# Patient Record
Sex: FEMALE | Race: WHITE | NOT HISPANIC OR LATINO | Employment: FULL TIME | URBAN - METROPOLITAN AREA
[De-identification: names, ages, dates, MRNs, and addresses within clinical notes are randomized per-mention and may not be internally consistent; named-entity substitution may affect disease eponyms.]

---

## 2017-03-29 ENCOUNTER — OFFICE VISIT (OUTPATIENT)
Dept: FAMILY MEDICINE CLINIC | Facility: CLINIC | Age: 52
End: 2017-03-29

## 2017-03-29 VITALS
HEART RATE: 86 BPM | WEIGHT: 150 LBS | OXYGEN SATURATION: 98 % | TEMPERATURE: 99 F | BODY MASS INDEX: 23.54 KG/M2 | HEIGHT: 67 IN | DIASTOLIC BLOOD PRESSURE: 64 MMHG | SYSTOLIC BLOOD PRESSURE: 92 MMHG

## 2017-03-29 DIAGNOSIS — J40 BRONCHITIS: Primary | ICD-10-CM

## 2017-03-29 LAB
EXPIRATION DATE: NORMAL
FLUAV AG NPH QL: NEGATIVE
FLUBV AG NPH QL: NEGATIVE
INTERNAL CONTROL: NORMAL
Lab: NORMAL

## 2017-03-29 PROCEDURE — 87804 INFLUENZA ASSAY W/OPTIC: CPT | Performed by: PHYSICIAN ASSISTANT

## 2017-03-29 PROCEDURE — 99213 OFFICE O/P EST LOW 20 MIN: CPT | Performed by: PHYSICIAN ASSISTANT

## 2017-03-29 RX ORDER — SPIRONOLACTONE 100 MG/1
100 TABLET, FILM COATED ORAL DAILY
COMMUNITY
Start: 2017-02-12 | End: 2018-09-17 | Stop reason: SDUPTHER

## 2017-03-29 RX ORDER — CITALOPRAM 20 MG/1
20 TABLET ORAL DAILY
COMMUNITY
Start: 2017-03-06 | End: 2017-08-26 | Stop reason: ALTCHOICE

## 2017-03-29 RX ORDER — LEVOTHYROXINE SODIUM 0.03 MG/1
25 TABLET ORAL DAILY
COMMUNITY
Start: 2017-02-12 | End: 2018-09-17 | Stop reason: SDUPTHER

## 2017-03-29 NOTE — PROGRESS NOTES
Subjective   Cadence Georges is a 51 y.o. female    Cough   This is a new problem. The current episode started yesterday. The problem has been gradually improving. The cough is productive of brown sputum and productive of purulent sputum. Associated symptoms include ear congestion, ear pain, a fever, headaches, nasal congestion, postnasal drip, rhinorrhea, a sore throat, sweats and wheezing. Pertinent negatives include no chest pain, chills or shortness of breath. The symptoms are aggravated by lying down. The treatment provided mild relief.    fever chills body aches, worse last 24 hours    The following portions of the patient's history were reviewed and updated as appropriate: allergies, current medications, past social history and problem list    Review of Systems   Constitutional: Positive for fever. Negative for chills and fatigue.   HENT: Positive for congestion, ear pain, postnasal drip, rhinorrhea, sinus pressure and sore throat.    Eyes: Positive for pain.   Respiratory: Positive for cough and wheezing. Negative for chest tightness and shortness of breath.    Cardiovascular: Negative for chest pain.   Gastrointestinal: Negative for nausea.   Neurological: Positive for headaches. Negative for dizziness.   Hematological: Negative for adenopathy.       Objective     Vitals:    03/29/17 1318   BP: 92/64   Pulse: 86   Temp: 99 °F (37.2 °C)   SpO2: 98%       Physical Exam   Constitutional: She appears well-developed and well-nourished.   HENT:   Head: Normocephalic and atraumatic.   Right Ear: Tympanic membrane and ear canal normal.   Left Ear: Tympanic membrane and ear canal normal.   Nose: Mucosal edema, rhinorrhea and sinus tenderness present. Right sinus exhibits maxillary sinus tenderness and frontal sinus tenderness. Left sinus exhibits maxillary sinus tenderness and frontal sinus tenderness.   Mouth/Throat: Oropharynx is clear and moist. No oropharyngeal exudate.   Eyes: Pupils are equal, round, and  reactive to light.   Cardiovascular: Normal rate and regular rhythm.    Pulmonary/Chest: Effort normal. She has decreased breath sounds in the right middle field and the left middle field.   Nursing note and vitals reviewed.      Assessment/Plan     Diagnoses and all orders for this visit:    Bronchitis  -     clarithromycin XL (BIAXIN XL) 500 MG 24 hr tablet; Take 2 tablets by mouth Daily.    Other orders  -     citalopram (CeleXA) 20 MG tablet; Take 20 mg by mouth Daily.  -     levothyroxine (SYNTHROID, LEVOTHROID) 25 MCG tablet; Take 25 mcg by mouth Daily.  -     spironolactone (ALDACTONE) 100 MG tablet; Take 100 mg by mouth Daily.     #1 Biaxin  mg 2 by mouth every day dispensed     Tussionex 1 teaspoon every 12 hours dispense 120 ML's

## 2017-08-26 ENCOUNTER — OFFICE VISIT (OUTPATIENT)
Dept: RETAIL CLINIC | Facility: CLINIC | Age: 52
End: 2017-08-26

## 2017-08-26 VITALS
OXYGEN SATURATION: 99 % | TEMPERATURE: 98.7 F | BODY MASS INDEX: 23.39 KG/M2 | RESPIRATION RATE: 20 BRPM | HEART RATE: 77 BPM | WEIGHT: 149 LBS | HEIGHT: 67 IN

## 2017-08-26 DIAGNOSIS — J02.9 SORE THROAT: ICD-10-CM

## 2017-08-26 DIAGNOSIS — R05.9 COUGHING: ICD-10-CM

## 2017-08-26 DIAGNOSIS — J06.9 ACUTE URI: Primary | ICD-10-CM

## 2017-08-26 LAB
EXPIRATION DATE: NORMAL
INTERNAL CONTROL: NORMAL
Lab: NORMAL
S PYO AG THROAT QL: NEGATIVE

## 2017-08-26 PROCEDURE — 87880 STREP A ASSAY W/OPTIC: CPT | Performed by: NURSE PRACTITIONER

## 2017-08-26 PROCEDURE — 99213 OFFICE O/P EST LOW 20 MIN: CPT | Performed by: NURSE PRACTITIONER

## 2017-08-26 RX ORDER — FLUTICASONE PROPIONATE 50 MCG
2 SPRAY, SUSPENSION (ML) NASAL DAILY
Qty: 1 EACH | Refills: 0 | Status: SHIPPED | OUTPATIENT
Start: 2017-08-26 | End: 2017-09-25

## 2017-08-26 RX ORDER — AMOXICILLIN 875 MG/1
875 TABLET, COATED ORAL 2 TIMES DAILY
Qty: 14 TABLET | Refills: 0 | Status: SHIPPED | OUTPATIENT
Start: 2017-08-26 | End: 2017-09-02

## 2017-08-26 RX ORDER — BENZONATATE 100 MG/1
100 CAPSULE ORAL 3 TIMES DAILY PRN
Qty: 15 CAPSULE | Refills: 0 | Status: SHIPPED | OUTPATIENT
Start: 2017-08-26 | End: 2017-08-31

## 2017-08-26 RX ORDER — LORATADINE 10 MG/1
10 TABLET ORAL DAILY
Qty: 30 TABLET | Refills: 0 | Status: SHIPPED | OUTPATIENT
Start: 2017-08-26 | End: 2017-09-25

## 2017-08-26 NOTE — PATIENT INSTRUCTIONS
Cough, Adult  A cough helps to clear your throat and lungs. A cough may last only 2-3 weeks (acute), or it may last longer than 8 weeks (chronic). Many different things can cause a cough. A cough may be a sign of an illness or another medical condition.  HOME CARE  · Pay attention to any changes in your cough.  · Take medicines only as told by your doctor.    If you were prescribed an antibiotic medicine, take it as told by your doctor. Do not stop taking it even if you start to feel better.    Talk with your doctor before you try using a cough medicine.  · Drink enough fluid to keep your pee (urine) clear or pale yellow.  · If the air is dry, use a cold steam vaporizer or humidifier in your home.  · Stay away from things that make you cough at work or at home.  · If your cough is worse at night, try using extra pillows to raise your head up higher while you sleep.  · Do not smoke, and try not to be around smoke. If you need help quitting, ask your doctor.  · Do not have caffeine.  · Do not drink alcohol.  · Rest as needed.  GET HELP IF:  · You have new problems (symptoms).  · You cough up yellow fluid (pus).  · Your cough does not get better after 2-3 weeks, or your cough gets worse.  · Medicine does not help your cough and you are not sleeping well.  · You have pain that gets worse or pain that is not helped with medicine.  · You have a fever.  · You are losing weight and you do not know why.  · You have night sweats.  GET HELP RIGHT AWAY IF:  · You cough up blood.  · You have trouble breathing.  · Your heartbeat is very fast.     This information is not intended to replace advice given to you by your health care provider. Make sure you discuss any questions you have with your health care provider.     Document Released: 08/30/2012 Document Revised: 09/07/2016 Document Reviewed: 02/24/2016  [employer] Interactive Patient Education ©2017 [employer]  Upper Respiratory Infection, Adult  Most upper respiratory  "infections (URIs) are caused by a virus. A URI affects the nose, throat, and upper air passages. The most common type of URI is often called \"the common cold.\"  HOME CARE   · Take medicines only as told by your doctor.  · Gargle warm saltwater or take cough drops to comfort your throat as told by your doctor.  · Use a warm mist humidifier or inhale steam from a shower to increase air moisture. This may make it easier to breathe.  · Drink enough fluid to keep your pee (urine) clear or pale yellow.  · Eat soups and other clear broths.  · Have a healthy diet.  · Rest as needed.  · Go back to work when your fever is gone or your doctor says it is okay.  ¨ You may need to stay home longer to avoid giving your URI to others.  ¨ You can also wear a face mask and wash your hands often to prevent spread of the virus.  · Use your inhaler more if you have asthma.  · Do not use any tobacco products, including cigarettes, chewing tobacco, or electronic cigarettes. If you need help quitting, ask your doctor.  GET HELP IF:  · You are getting worse, not better.  · Your symptoms are not helped by medicine.  · You have chills.  · You are getting more short of breath.  · You have brown or red mucus.  · You have yellow or brown discharge from your nose.  · You have pain in your face, especially when you bend forward.  · You have a fever.  · You have puffy (swollen) neck glands.  · You have pain while swallowing.  · You have white areas in the back of your throat.  GET HELP RIGHT AWAY IF:   · You have very bad or constant:    Headache.    Ear pain.    Pain in your forehead, behind your eyes, and over your cheekbones (sinus pain).    Chest pain.  · You have long-lasting (chronic) lung disease and any of the following:    Wheezing.    Long-lasting cough.    Coughing up blood.    A change in your usual mucus.  · You have a stiff neck.  · You have changes in your:    Vision.    Hearing.    Thinking.    Mood.  MAKE SURE YOU:   · Understand " these instructions.  · Will watch your condition.  · Will get help right away if you are not doing well or get worse.     This information is not intended to replace advice given to you by your health care provider. Make sure you discuss any questions you have with your health care provider.    Medications and plan of care reviewed with patient and teaching done on medication reactions/side effects.  Take antibiotic if worse as discussed,  Rest, plenty of fluids, comfort measures, humidifier, warm salt water gargles, and follow up with PCP if symptoms persist.  If worse go to urgent care or ER as discussed.     Document Released: 06/05/2009 Document Revised: 05/03/2016 Document Reviewed: 03/25/2015  [employer] Interactive Patient Education ©2017 [employer]

## 2017-08-26 NOTE — PROGRESS NOTES
Subjective   Cadence Georges is a 52 y.o. female presents with sore throat and upper respiratory symptoms.  Patient wants to rule out strep due to watching grandchild, doesn't want her to get sick.     URI    This is a new problem. The current episode started in the past 7 days. The problem has been gradually worsening. There has been no fever. Associated symptoms include congestion, coughing (productive cough), ear pain (bilat ear pressure), headaches and a sore throat. Pertinent negatives include no abdominal pain, chest pain, diarrhea, dysuria, nausea, neck pain, rash, rhinorrhea, sneezing, swollen glands, vomiting or wheezing. Treatments tried: nyquil and chloroseptic spray. The treatment provided mild relief.   Sore Throat    This is a new problem. The current episode started in the past 7 days. The problem has been waxing and waning. There has been no fever. The pain is at a severity of 4/10. The pain is mild. Associated symptoms include congestion, coughing (productive cough), ear pain (bilat ear pressure) and headaches. Pertinent negatives include no abdominal pain, diarrhea, ear discharge, hoarse voice, neck pain, shortness of breath, stridor, swollen glands, trouble swallowing or vomiting. Treatments tried: nyquil and chloroseptic spray. The treatment provided mild relief.        The following portions of the patient's history were reviewed and updated as appropriate: allergies, current medications, past family history, past medical history, past social history and past surgical history.    Review of Systems   Constitutional: Negative for chills, fever and unexpected weight change.   HENT: Positive for congestion, ear pain (bilat ear pressure), postnasal drip and sore throat. Negative for ear discharge, hoarse voice, rhinorrhea, sinus pressure, sneezing, trouble swallowing and voice change.    Eyes: Negative.    Respiratory: Positive for cough (productive cough) and chest tightness (some chest tightness or  congestion especially with coughing). Negative for shortness of breath, wheezing and stridor.    Cardiovascular: Negative.  Negative for chest pain.   Gastrointestinal: Negative.  Negative for abdominal pain, diarrhea, nausea and vomiting.   Genitourinary: Negative.  Negative for dysuria.   Musculoskeletal: Negative.  Negative for neck pain.   Skin: Negative.  Negative for rash.   Neurological: Positive for headaches.       Objective   Physical Exam   Constitutional: She is oriented to person, place, and time. She appears well-developed and well-nourished. No distress.   HENT:   Head: Normocephalic and atraumatic.   Right Ear: A middle ear effusion is present.   Left Ear: A middle ear effusion is present.   Nose: Mucosal edema present.   Mouth/Throat: Uvula is midline and mucous membranes are normal. Posterior oropharyngeal erythema (mild erythema with PND) present. Tonsils are 0 on the right. Tonsils are 0 on the left. No tonsillar exudate.   Eyes: Conjunctivae and lids are normal. Pupils are equal, round, and reactive to light.   Neck: Normal range of motion.   Cardiovascular: Normal rate, regular rhythm and normal heart sounds.    No murmur heard.  Pulmonary/Chest: Effort normal and breath sounds normal. No respiratory distress.   Lymphadenopathy:     She has cervical adenopathy (bilat anterior cervical with tenderness on palpation).   Neurological: She is alert and oriented to person, place, and time.   Skin: Skin is warm and dry.   Psychiatric: She has a normal mood and affect. Her speech is normal and behavior is normal.       Assessment/Plan   Cadence was seen today for cough and sore throat.    Diagnoses and all orders for this visit:    Sore throat  -     POC Rapid Strep A, negative, discussed with patient  Results for orders placed or performed in visit on 08/26/17   POC Rapid Strep A   Result Value Ref Range    Rapid Strep A Screen Negative Negative, VALID, INVALID, Not Performed    Internal Control  Passed Passed    Lot Number [id number]     Expiration Date 2/2019          Acute URI  Coughing  -     loratadine (CLARITIN) 10 MG tablet; Take 1 tablet by mouth Daily for 30 days.  -     fluticasone (FLONASE) 50 MCG/ACT nasal spray; 2 sprays into each nostril Daily for 30 days.  -     amoxicillin (AMOXIL) 875 MG tablet; Take 1 tablet by mouth 2 (Two) Times a Day for 7 days.  -     benzonatate (TESSALON) 100 MG capsule; Take 1 capsule by mouth 3 (Three) Times a Day As Needed for Cough for up to 5 days.      Medications and plan of care reviewed with patient and teaching done on medication reactions/side effects.  Take antibiotic if worse as discussed,  Rest, plenty of fluids, comfort measures, humidifier, warm salt water gargles, and follow up with PCP if symptoms persist.  If worse go to urgent care or ER as discussed.  Patient verbalized understanding.    ASHLEE Whyte

## 2017-10-03 ENCOUNTER — TRANSCRIBE ORDERS (OUTPATIENT)
Dept: ADMINISTRATIVE | Facility: HOSPITAL | Age: 52
End: 2017-10-03

## 2017-10-03 DIAGNOSIS — Z12.31 VISIT FOR SCREENING MAMMOGRAM: Primary | ICD-10-CM

## 2017-11-13 ENCOUNTER — HOSPITAL ENCOUNTER (OUTPATIENT)
Dept: MAMMOGRAPHY | Facility: HOSPITAL | Age: 52
Discharge: HOME | End: 2017-11-13
Attending: OBSTETRICS & GYNECOLOGY | Admitting: OBSTETRICS & GYNECOLOGY

## 2017-11-13 DIAGNOSIS — Z12.31 VISIT FOR SCREENING MAMMOGRAM: ICD-10-CM

## 2017-11-13 PROCEDURE — 77067 SCR MAMMO BI INCL CAD: CPT | Performed by: RADIOLOGY

## 2017-11-13 PROCEDURE — 77063 BREAST TOMOSYNTHESIS BI: CPT

## 2017-11-13 PROCEDURE — 77063 BREAST TOMOSYNTHESIS BI: CPT | Performed by: RADIOLOGY

## 2017-11-13 PROCEDURE — G0202 SCR MAMMO BI INCL CAD: HCPCS

## 2018-09-17 ENCOUNTER — OFFICE VISIT (OUTPATIENT)
Dept: FAMILY MEDICINE CLINIC | Facility: CLINIC | Age: 53
End: 2018-09-17

## 2018-09-17 VITALS
HEART RATE: 68 BPM | SYSTOLIC BLOOD PRESSURE: 116 MMHG | TEMPERATURE: 98.3 F | WEIGHT: 148 LBS | BODY MASS INDEX: 23.23 KG/M2 | DIASTOLIC BLOOD PRESSURE: 64 MMHG | OXYGEN SATURATION: 99 % | HEIGHT: 67 IN

## 2018-09-17 DIAGNOSIS — E03.9 ACQUIRED HYPOTHYROIDISM: ICD-10-CM

## 2018-09-17 DIAGNOSIS — Z00.00 GENERAL MEDICAL EXAM: Primary | ICD-10-CM

## 2018-09-17 DIAGNOSIS — Z12.11 COLON CANCER SCREENING: ICD-10-CM

## 2018-09-17 PROCEDURE — 99396 PREV VISIT EST AGE 40-64: CPT | Performed by: PHYSICIAN ASSISTANT

## 2018-09-17 RX ORDER — LORATADINE 10 MG/1
CAPSULE, LIQUID FILLED ORAL
COMMUNITY
End: 2021-03-22

## 2018-09-17 NOTE — PROGRESS NOTES
Subjective   Cadence Georges is a 53 y.o. female  Annual Exam (Annual Physical Exam )      History of Present Illness  Patient presents today for a preventive medical visit.  Patient is here to determine screening labs and tests that are due and to determine immunization status as well.  Patient will be counseled regarding preventative medicine issues such as regular exercise and  healthy diet as well.    The following portions of the patient's history were reviewed and updated as appropriate: allergies, current medications, past social history and problem list    Review of Systems   Constitutional: Negative.    HENT: Negative.    Eyes: Negative.    Respiratory: Negative.    Cardiovascular: Negative.    Gastrointestinal: Negative.    Endocrine: Negative.    Genitourinary: Negative.    Musculoskeletal: Negative.    Skin: Negative.    Allergic/Immunologic: Negative.    Neurological: Negative.    Hematological: Negative.    Psychiatric/Behavioral: Negative.    All other systems reviewed and are negative.      Objective     Vitals:    09/17/18 1428   BP: 116/64   Pulse: 68   Temp: 98.3 °F (36.8 °C)   SpO2: 99%       Physical Exam   Constitutional: She is oriented to person, place, and time. She appears well-developed and well-nourished.   HENT:   Head: Normocephalic and atraumatic.   Right Ear: External ear normal.   Left Ear: External ear normal.   Nose: Nose normal.   Mouth/Throat: Oropharynx is clear and moist.   Eyes: Pupils are equal, round, and reactive to light. Conjunctivae and EOM are normal.   Neck: Normal range of motion. Neck supple. No JVD present. Carotid bruit is not present. No thyromegaly present.   Cardiovascular: Normal rate, regular rhythm, normal heart sounds and intact distal pulses.    No murmur heard.  Pulmonary/Chest: Effort normal and breath sounds normal.   Abdominal: Soft. Bowel sounds are normal. She exhibits no mass. There is no tenderness.   Musculoskeletal: Normal range of motion. She  exhibits no edema.   Lymphadenopathy:     She has no cervical adenopathy.   Neurological: She is alert and oriented to person, place, and time. She has normal reflexes. No cranial nerve deficit.   Skin: Skin is warm and dry.   Psychiatric: She has a normal mood and affect.   Nursing note and vitals reviewed.    ECG 12 Lead  Date/Time: 9/18/2018 8:55 AM  Performed by: MOIZ LOPEZ  Authorized by: MOIZ LOPEZ   Comparison: not compared with previous ECG   Rhythm: sinus rhythm  Rate: normal  Conduction: conduction normal  ST Segments: ST segments normal  T Waves: T waves normal  QRS axis: normal  Clinical impression: normal ECG          Discussed preventative medicine issues with patient including regular exercise, healthy diet, stress reduction, adequate sleep and recommended age-appropriate screening studies.  Assessment/Plan     Diagnoses and all orders for this visit:    General medical exam  -     TSH; Future  -     Basic Metabolic Panel; Future  -     CBC & Differential; Future  -     Lipid Panel; Future    Colon cancer screening  -     Ambulatory Referral to Colorectal Surgery    Acquired hypothyroidism  -     TSH; Future    Other orders  -     Loratadine (CLARITIN) 10 MG capsule; Take  by mouth.  -     ECG 12 Lead

## 2018-09-18 ENCOUNTER — TRANSCRIBE ORDERS (OUTPATIENT)
Dept: ADMINISTRATIVE | Facility: HOSPITAL | Age: 53
End: 2018-09-18

## 2018-09-18 DIAGNOSIS — Z12.31 VISIT FOR SCREENING MAMMOGRAM: Primary | ICD-10-CM

## 2018-09-18 PROCEDURE — 93000 ELECTROCARDIOGRAM COMPLETE: CPT | Performed by: PHYSICIAN ASSISTANT

## 2018-09-19 ENCOUNTER — LAB (OUTPATIENT)
Dept: LAB | Facility: HOSPITAL | Age: 53
End: 2018-09-19

## 2018-09-19 DIAGNOSIS — E03.9 ACQUIRED HYPOTHYROIDISM: ICD-10-CM

## 2018-09-19 DIAGNOSIS — Z00.00 GENERAL MEDICAL EXAM: ICD-10-CM

## 2018-09-19 LAB
ANION GAP SERPL CALCULATED.3IONS-SCNC: 6 MMOL/L (ref 3–11)
ARTICHOKE IGE QN: 100 MG/DL (ref 0–130)
BASOPHILS # BLD AUTO: 0.06 10*3/MM3 (ref 0–0.2)
BASOPHILS NFR BLD AUTO: 0.7 % (ref 0–1)
BUN BLD-MCNC: 14 MG/DL (ref 9–23)
BUN/CREAT SERPL: 17.5 (ref 7–25)
CALCIUM SPEC-SCNC: 9.1 MG/DL (ref 8.7–10.4)
CHLORIDE SERPL-SCNC: 105 MMOL/L (ref 99–109)
CHOLEST SERPL-MCNC: 162 MG/DL (ref 0–200)
CO2 SERPL-SCNC: 28 MMOL/L (ref 20–31)
CREAT BLD-MCNC: 0.8 MG/DL (ref 0.6–1.3)
DEPRECATED RDW RBC AUTO: 40.4 FL (ref 37–54)
EOSINOPHIL # BLD AUTO: 0.28 10*3/MM3 (ref 0–0.3)
EOSINOPHIL NFR BLD AUTO: 3.4 % (ref 0–3)
ERYTHROCYTE [DISTWIDTH] IN BLOOD BY AUTOMATED COUNT: 11.9 % (ref 11.3–14.5)
GFR SERPL CREATININE-BSD FRML MDRD: 76 ML/MIN/1.73
GLUCOSE BLD-MCNC: 86 MG/DL (ref 70–100)
HCT VFR BLD AUTO: 43.2 % (ref 34.5–44)
HDLC SERPL-MCNC: 60 MG/DL (ref 40–60)
HGB BLD-MCNC: 13.8 G/DL (ref 11.5–15.5)
IMM GRANULOCYTES # BLD: 0.02 10*3/MM3 (ref 0–0.03)
IMM GRANULOCYTES NFR BLD: 0.2 % (ref 0–0.6)
LYMPHOCYTES # BLD AUTO: 2.97 10*3/MM3 (ref 0.6–4.8)
LYMPHOCYTES NFR BLD AUTO: 36.6 % (ref 24–44)
MCH RBC QN AUTO: 29.2 PG (ref 27–31)
MCHC RBC AUTO-ENTMCNC: 31.9 G/DL (ref 32–36)
MCV RBC AUTO: 91.5 FL (ref 80–99)
MONOCYTES # BLD AUTO: 0.7 10*3/MM3 (ref 0–1)
MONOCYTES NFR BLD AUTO: 8.6 % (ref 0–12)
NEUTROPHILS # BLD AUTO: 4.09 10*3/MM3 (ref 1.5–8.3)
NEUTROPHILS NFR BLD AUTO: 50.5 % (ref 41–71)
PLATELET # BLD AUTO: 239 10*3/MM3 (ref 150–450)
PMV BLD AUTO: 10 FL (ref 6–12)
POTASSIUM BLD-SCNC: 4 MMOL/L (ref 3.5–5.5)
RBC # BLD AUTO: 4.72 10*6/MM3 (ref 3.89–5.14)
SODIUM BLD-SCNC: 139 MMOL/L (ref 132–146)
TRIGL SERPL-MCNC: 58 MG/DL (ref 0–150)
TSH SERPL DL<=0.05 MIU/L-ACNC: 2.52 MIU/ML (ref 0.35–5.35)
WBC NRBC COR # BLD: 8.12 10*3/MM3 (ref 3.5–10.8)

## 2018-09-19 PROCEDURE — 84443 ASSAY THYROID STIM HORMONE: CPT

## 2018-09-19 PROCEDURE — 80048 BASIC METABOLIC PNL TOTAL CA: CPT

## 2018-09-19 PROCEDURE — 80061 LIPID PANEL: CPT

## 2018-09-19 PROCEDURE — 36415 COLL VENOUS BLD VENIPUNCTURE: CPT

## 2018-09-19 PROCEDURE — 85025 COMPLETE CBC W/AUTO DIFF WBC: CPT

## 2018-10-15 ENCOUNTER — TELEPHONE (OUTPATIENT)
Dept: FAMILY MEDICINE CLINIC | Facility: CLINIC | Age: 53
End: 2018-10-15

## 2018-10-15 NOTE — TELEPHONE ENCOUNTER
----- Message from Tangela Bullock sent at 10/15/2018  2:37 PM EDT -----  Contact: PT  REQUESTING VALTREX AND DENAVIR 1% (1.5G TUBE) FOR FEVER BLISTER    [facility] - [phone] (Phone)  [phone] (Fax)

## 2018-10-16 RX ORDER — VALACYCLOVIR HYDROCHLORIDE 1 G/1
1000 TABLET, FILM COATED ORAL 2 TIMES DAILY
Qty: 10 TABLET | Refills: 0 | Status: SHIPPED | OUTPATIENT
Start: 2018-10-16 | End: 2021-03-22 | Stop reason: SDUPTHER

## 2018-10-16 RX ORDER — PENCICLOVIR 10 MG/G
CREAM TOPICAL
Qty: 5 G | Refills: 0 | Status: SHIPPED | OUTPATIENT
Start: 2018-10-16 | End: 2021-03-22

## 2018-10-30 ENCOUNTER — OFFICE VISIT (OUTPATIENT)
Dept: RETAIL CLINIC | Facility: CLINIC | Age: 53
End: 2018-10-30

## 2018-10-30 VITALS
OXYGEN SATURATION: 98 % | HEART RATE: 82 BPM | SYSTOLIC BLOOD PRESSURE: 98 MMHG | TEMPERATURE: 98.3 F | BODY MASS INDEX: 23.07 KG/M2 | HEIGHT: 67 IN | RESPIRATION RATE: 20 BRPM | WEIGHT: 147 LBS | DIASTOLIC BLOOD PRESSURE: 74 MMHG

## 2018-10-30 DIAGNOSIS — J02.9 ACUTE VIRAL PHARYNGITIS: Primary | ICD-10-CM

## 2018-10-30 LAB
EXPIRATION DATE: NORMAL
INTERNAL CONTROL: NORMAL
Lab: NORMAL
S PYO AG THROAT QL: NEGATIVE

## 2018-10-30 PROCEDURE — 99213 OFFICE O/P EST LOW 20 MIN: CPT | Performed by: NURSE PRACTITIONER

## 2018-10-30 PROCEDURE — 87880 STREP A ASSAY W/OPTIC: CPT | Performed by: NURSE PRACTITIONER

## 2018-10-30 RX ORDER — IBUPROFEN 800 MG/1
800 TABLET ORAL EVERY 6 HOURS PRN
Qty: 30 TABLET | Refills: 0 | Status: SHIPPED | OUTPATIENT
Start: 2018-10-30 | End: 2020-07-22

## 2018-10-30 NOTE — PROGRESS NOTES
Subjective   Cadence Georges is a 53 y.o. female.   Chief Complaint   Patient presents with   • Sore Throat      52 yo w/f presents with complaint of sore throat duration of 2 days, no fever.  Reports throat is a 2/10, 8/10 when swallowing.  She has taken no medication prior to arrival at clinic.  Refer to HPI/ROS for additional information.      Sore Throat    This is a new problem. The current episode started in the past 7 days. The problem has been gradually worsening. Neither side of throat is experiencing more pain than the other. There has been no fever. The pain is at a severity of 8/10. Associated symptoms include a hoarse voice. She has tried NSAIDs for the symptoms. The treatment provided mild relief.        The following portions of the patient's history were reviewed and updated as appropriate: allergies, current medications, past family history, past medical history, past social history, past surgical history and problem list.    Current Outpatient Prescriptions:   •  citalopram (CeleXA) 20 MG tablet, Take 1.5 tablets by mouth Daily., Disp: 135 tablet, Rfl: 3  •  Conj Estrogens-Bazedoxifene 0.45-20 MG tablet, Take 1 tablet by mouth Daily., Disp: 30 tablet, Rfl: 11  •  Loratadine (CLARITIN) 10 MG capsule, Take  by mouth., Disp: , Rfl:   •  spironolactone (ALDACTONE) 100 MG tablet, Take 1 tablet by mouth Daily., Disp: 90 tablet, Rfl: 3  •  benzocaine-menthol (CHLORASEPTIC) 6-10 MG lozenge, Take 1 lozenge by mouth Every 2 (Two) Hours As Needed for Sore Throat., Disp: , Rfl:   •  ibuprofen (ADVIL,MOTRIN) 800 MG tablet, Take 1 tablet by mouth Every 6 (Six) Hours As Needed for Moderate Pain  or Fever., Disp: 30 tablet, Rfl: 0  •  penciclovir (DENAVIR) 1 % cream, Apply topically to the appropriate area as directed Every 2 (Two) Hours., Disp: 5 g, Rfl: 0  •  valACYclovir (VALTREX) 1000 MG tablet, Take 1 tablet by mouth 2 (Two) Times a Day., Disp: 10 tablet, Rfl: 0  •  valACYclovir (VALTREX) 500 MG tablet, Take 4  "tablets by mouth Every 12 (Twelve) Hours x1 day., Disp: 8 tablet, Rfl: 0    Review of Systems   Constitutional: Positive for activity change, fatigue and fever.   HENT: Positive for hoarse voice and sore throat.    Eyes: Negative.    Respiratory: Negative.    Cardiovascular: Negative.    Gastrointestinal: Negative.    Skin: Negative.    Psychiatric/Behavioral: Negative.      BP 98/74 (BP Location: Left arm, Patient Position: Sitting)   Pulse 82   Temp 98.3 °F (36.8 °C) (Oral)   Resp 20   Ht 170.2 cm (67\")   Wt 66.7 kg (147 lb)   SpO2 98%   BMI 23.02 kg/m²     Objective   No Known Allergies    Physical Exam   Constitutional: She appears well-developed and well-nourished. No distress.   HENT:   Head: Normocephalic.   Nose: Nose normal.   Mouth/Throat: Mucous membranes are normal. Mucous membranes are not pale, not dry and not cyanotic. Posterior oropharyngeal erythema present. Tonsils are 0 on the right. Tonsils are 0 on the left. No tonsillar exudate.   Eyes: Conjunctivae are normal.   Neck: Normal range of motion. No JVD present. No tracheal deviation present. No thyromegaly present.   Cardiovascular: Normal rate, regular rhythm and normal heart sounds.  Exam reveals no gallop and no friction rub.    No murmur heard.  Pulmonary/Chest: Effort normal and breath sounds normal. No stridor. No respiratory distress. She has no wheezes. She has no rales. She exhibits no tenderness.   Lymphadenopathy:     She has no cervical adenopathy.   Skin: Skin is warm. Capillary refill takes less than 2 seconds. She is not diaphoretic.   Psychiatric: She has a normal mood and affect. Her behavior is normal.   Vitals reviewed.      Assessment/Plan   Cadence was seen today for sore throat.    Diagnoses and all orders for this visit:    Acute viral pharyngitis  -     POC Rapid Strep A    Other orders  -     ibuprofen (ADVIL,MOTRIN) 800 MG tablet; Take 1 tablet by mouth Every 6 (Six) Hours As Needed for Moderate Pain  or Fever.  -  "    benzocaine-menthol (CHLORASEPTIC) 6-10 MG lozenge; Take 1 lozenge by mouth Every 2 (Two) Hours As Needed for Sore Throat.        Results for orders placed or performed in visit on 10/30/18   POC Rapid Strep A   Result Value Ref Range    Rapid Strep A Screen Negative Negative, VALID, INVALID, Not Performed    Internal Control Passed Passed    Lot Number [id number]     Expiration Date 3,078,020          An After Visit Summary was printed, reviewed, and given to the patient. Understanding verbalized and agrees with treatment plan.  If no improvement or becomes worse, follow up with primary or go to [facility]/ER.        October 30, 2018 10:43 AM

## 2018-10-30 NOTE — PATIENT INSTRUCTIONS
Pharyngitis  Pharyngitis is a sore throat (pharynx). There is redness, pain, and swelling of your throat.  Follow these instructions at home:  · Drink enough fluids to keep your pee (urine) clear or pale yellow.  · Only take medicine as told by your doctor.  ? You may get sick again if you do not take medicine as told. Finish your medicines, even if you start to feel better.  ? Do not take aspirin.  · Rest.  · Rinse your mouth (gargle) with salt water (½ tsp of salt per 1 qt of water) every 1-2 hours. This will help the pain.  · If you are not at risk for choking, you can suck on hard candy or sore throat lozenges.  Contact a doctor if:  · You have large, tender lumps on your neck.  · You have a rash.  · You cough up green, yellow-brown, or bloody spit.  Get help right away if:  · You have a stiff neck.  · You drool or cannot swallow liquids.  · You throw up (vomit) or are not able to keep medicine or liquids down.  · You have very bad pain that does not go away with medicine.  · You have problems breathing (not from a stuffy nose).  This information is not intended to replace advice given to you by your health care provider. Make sure you discuss any questions you have with your health care provider.  Document Released: 06/05/2009 Document Revised: 05/25/2017 Document Reviewed: 08/25/2014  [employer] Interactive Patient Education © 2017 [employer]

## 2018-11-19 ENCOUNTER — HOSPITAL ENCOUNTER (OUTPATIENT)
Dept: MAMMOGRAPHY | Facility: HOSPITAL | Age: 53
Discharge: HOME | End: 2018-11-19
Attending: OBSTETRICS & GYNECOLOGY | Admitting: OBSTETRICS & GYNECOLOGY

## 2018-11-19 DIAGNOSIS — Z12.31 VISIT FOR SCREENING MAMMOGRAM: ICD-10-CM

## 2018-11-19 PROCEDURE — 77063 BREAST TOMOSYNTHESIS BI: CPT

## 2018-11-19 PROCEDURE — 77067 SCR MAMMO BI INCL CAD: CPT

## 2018-11-19 PROCEDURE — 77067 SCR MAMMO BI INCL CAD: CPT | Performed by: RADIOLOGY

## 2018-11-19 PROCEDURE — 77063 BREAST TOMOSYNTHESIS BI: CPT | Performed by: RADIOLOGY

## 2019-01-07 ENCOUNTER — HOSPITAL ENCOUNTER (OUTPATIENT)
Dept: MAMMOGRAPHY | Facility: HOSPITAL | Age: 54
Discharge: HOME | End: 2019-01-07
Admitting: RADIOLOGY

## 2019-01-07 ENCOUNTER — HOSPITAL ENCOUNTER (OUTPATIENT)
Dept: ULTRASOUND IMAGING | Facility: HOSPITAL | Age: 54
Discharge: HOME | End: 2019-01-07

## 2019-01-07 DIAGNOSIS — R92.8 ABNORMAL MAMMOGRAM: ICD-10-CM

## 2019-01-07 PROCEDURE — 76642 ULTRASOUND BREAST LIMITED: CPT | Performed by: RADIOLOGY

## 2019-01-07 PROCEDURE — 77061 BREAST TOMOSYNTHESIS UNI: CPT | Performed by: RADIOLOGY

## 2019-01-07 PROCEDURE — G0279 TOMOSYNTHESIS, MAMMO: HCPCS

## 2019-01-07 PROCEDURE — 77065 DX MAMMO INCL CAD UNI: CPT | Performed by: RADIOLOGY

## 2019-01-07 PROCEDURE — 76642 ULTRASOUND BREAST LIMITED: CPT

## 2019-01-07 PROCEDURE — 77065 DX MAMMO INCL CAD UNI: CPT

## 2020-02-07 ENCOUNTER — OFFICE VISIT (OUTPATIENT)
Dept: FAMILY MEDICINE CLINIC | Facility: CLINIC | Age: 55
End: 2020-02-07

## 2020-02-07 VITALS
HEART RATE: 64 BPM | DIASTOLIC BLOOD PRESSURE: 60 MMHG | SYSTOLIC BLOOD PRESSURE: 106 MMHG | BODY MASS INDEX: 23.86 KG/M2 | WEIGHT: 152 LBS | OXYGEN SATURATION: 100 % | RESPIRATION RATE: 16 BRPM | HEIGHT: 67 IN

## 2020-02-07 DIAGNOSIS — E03.9 ACQUIRED HYPOTHYROIDISM: ICD-10-CM

## 2020-02-07 DIAGNOSIS — Z00.00 GENERAL MEDICAL EXAM: Primary | ICD-10-CM

## 2020-02-07 DIAGNOSIS — G25.81 RESTLESS LEG SYNDROME: ICD-10-CM

## 2020-02-07 DIAGNOSIS — R53.83 FATIGUE, UNSPECIFIED TYPE: ICD-10-CM

## 2020-02-07 PROCEDURE — 99396 PREV VISIT EST AGE 40-64: CPT | Performed by: PHYSICIAN ASSISTANT

## 2020-02-07 NOTE — PROGRESS NOTES
Subjective   Cadence Georges is a 54 y.o. female  Annual Exam (tired, would like thyroid rechecked to see if she needs to go back on medicine)      History of Present Illness  Patient presents today for a preventive medical visit.  Patient is here to determine screening labs and tests that are due and to determine immunization status as well.  Patient will be counseled regarding preventative medicine issues such as regular exercise and  healthy diet as well.  The following portions of the patient's history were reviewed and updated as appropriate: allergies, current medications, past social history and problem list    Review of Systems   Constitutional: Positive for fatigue.   HENT: Negative.    Eyes: Negative.    Respiratory: Negative.  Negative for apnea.    Cardiovascular: Negative.    Gastrointestinal: Negative.    Endocrine: Negative.    Genitourinary: Negative.    Musculoskeletal: Negative.    Skin: Negative.    Allergic/Immunologic: Negative.    Neurological: Positive for tremors ( Restless legs at times).   Hematological: Negative.    Psychiatric/Behavioral: Positive for sleep disturbance ( Trouble staying asleep at times).   All other systems reviewed and are negative.      Objective     Vitals:    02/07/20 1527   BP: 106/60   Pulse: 64   Resp: 16   SpO2: 100%       Physical Exam   Constitutional: She is oriented to person, place, and time. She appears well-developed and well-nourished. No distress.   HENT:   Head: Normocephalic and atraumatic.   Right Ear: External ear normal.   Left Ear: External ear normal.   Nose: Nose normal.   Mouth/Throat: Oropharynx is clear and moist.   Eyes: Pupils are equal, round, and reactive to light. Conjunctivae and EOM are normal.   Neck: Normal range of motion. Neck supple. No JVD present. Carotid bruit is not present. No thyromegaly present.   Cardiovascular: Normal rate, regular rhythm, normal heart sounds and intact distal pulses.   No murmur heard.  Pulmonary/Chest:  Breath sounds normal. No respiratory distress.   Abdominal: Soft. Bowel sounds are normal. She exhibits no mass. There is no tenderness.   Musculoskeletal: Normal range of motion. She exhibits no edema, tenderness or deformity.   Lymphadenopathy:     She has no cervical adenopathy.   Neurological: She is alert and oriented to person, place, and time. She has normal reflexes. She displays normal reflexes. No cranial nerve deficit or sensory deficit. She exhibits normal muscle tone. Coordination normal.   Skin: Skin is warm and dry. She is not diaphoretic.   Psychiatric: She has a normal mood and affect. Her behavior is normal. Judgment and thought content normal.   Nursing note and vitals reviewed.    Discussed preventative medicine issues with patient including regular exercise, healthy diet, stress reduction, adequate sleep and recommended age-appropriate screening studies.  Assessment/Plan     Cadence was seen today for annual exam.    Diagnoses and all orders for this visit:    General medical exam  -     Comprehensive Metabolic Panel; Future  -     CBC & Differential; Future  -     Lipid Panel; Future  -     Vitamin B12; Future    Fatigue, unspecified type  -     TSH; Future  -     Vitamin B12; Future  -     T4, Free; Future  -     Iron; Future    Acquired hypothyroidism  -     TSH; Future  -     T4, Free; Future    Restless leg syndrome    I discussed with patient that if her labs are all within normal limits no medications need to be adjusted than I would send a prescription for Robaxin for her to take at nighttime to see if this helps her restless leg syndrome and if that helps her with improved sleep efficiency.  Also discussed consideration of referral  for sleep study.

## 2020-02-18 ENCOUNTER — LAB (OUTPATIENT)
Dept: LAB | Facility: HOSPITAL | Age: 55
End: 2020-02-18

## 2020-02-18 DIAGNOSIS — R53.83 FATIGUE, UNSPECIFIED TYPE: ICD-10-CM

## 2020-02-18 DIAGNOSIS — Z00.00 GENERAL MEDICAL EXAM: ICD-10-CM

## 2020-02-18 DIAGNOSIS — E03.9 ACQUIRED HYPOTHYROIDISM: ICD-10-CM

## 2020-02-18 LAB
ALBUMIN SERPL-MCNC: 3.9 G/DL (ref 3.5–5.2)
ALBUMIN/GLOB SERPL: 1.6 G/DL
ALP SERPL-CCNC: 52 U/L (ref 39–117)
ALT SERPL W P-5'-P-CCNC: 14 U/L (ref 1–33)
ANION GAP SERPL CALCULATED.3IONS-SCNC: 13.4 MMOL/L (ref 5–15)
AST SERPL-CCNC: 16 U/L (ref 1–32)
BASOPHILS # BLD AUTO: 0.07 10*3/MM3 (ref 0–0.2)
BASOPHILS NFR BLD AUTO: 1.1 % (ref 0–1.5)
BILIRUB SERPL-MCNC: 0.2 MG/DL (ref 0.2–1.2)
BUN BLD-MCNC: 15 MG/DL (ref 6–20)
BUN/CREAT SERPL: 21.1 (ref 7–25)
CALCIUM SPEC-SCNC: 9.2 MG/DL (ref 8.6–10.5)
CHLORIDE SERPL-SCNC: 103 MMOL/L (ref 98–107)
CHOLEST SERPL-MCNC: 161 MG/DL (ref 0–200)
CO2 SERPL-SCNC: 24.6 MMOL/L (ref 22–29)
CREAT BLD-MCNC: 0.71 MG/DL (ref 0.57–1)
DEPRECATED RDW RBC AUTO: 39 FL (ref 37–54)
EOSINOPHIL # BLD AUTO: 0.28 10*3/MM3 (ref 0–0.4)
EOSINOPHIL NFR BLD AUTO: 4.4 % (ref 0.3–6.2)
ERYTHROCYTE [DISTWIDTH] IN BLOOD BY AUTOMATED COUNT: 11.7 % (ref 12.3–15.4)
GFR SERPL CREATININE-BSD FRML MDRD: 86 ML/MIN/1.73
GLOBULIN UR ELPH-MCNC: 2.5 GM/DL
GLUCOSE BLD-MCNC: 92 MG/DL (ref 65–99)
HCT VFR BLD AUTO: 41.7 % (ref 34–46.6)
HDLC SERPL-MCNC: 64 MG/DL (ref 40–60)
HGB BLD-MCNC: 14 G/DL (ref 12–15.9)
IMM GRANULOCYTES # BLD AUTO: 0.03 10*3/MM3 (ref 0–0.05)
IMM GRANULOCYTES NFR BLD AUTO: 0.5 % (ref 0–0.5)
IRON 24H UR-MRATE: 72 MCG/DL (ref 37–145)
LDLC SERPL CALC-MCNC: 88 MG/DL (ref 0–100)
LDLC/HDLC SERPL: 1.37 {RATIO}
LYMPHOCYTES # BLD AUTO: 2.42 10*3/MM3 (ref 0.7–3.1)
LYMPHOCYTES NFR BLD AUTO: 38.1 % (ref 19.6–45.3)
MCH RBC QN AUTO: 30.4 PG (ref 26.6–33)
MCHC RBC AUTO-ENTMCNC: 33.6 G/DL (ref 31.5–35.7)
MCV RBC AUTO: 90.7 FL (ref 79–97)
MONOCYTES # BLD AUTO: 0.46 10*3/MM3 (ref 0.1–0.9)
MONOCYTES NFR BLD AUTO: 7.2 % (ref 5–12)
NEUTROPHILS # BLD AUTO: 3.09 10*3/MM3 (ref 1.7–7)
NEUTROPHILS NFR BLD AUTO: 48.7 % (ref 42.7–76)
NRBC BLD AUTO-RTO: 0 /100 WBC (ref 0–0.2)
PLATELET # BLD AUTO: 240 10*3/MM3 (ref 140–450)
PMV BLD AUTO: 9.8 FL (ref 6–12)
POTASSIUM BLD-SCNC: 4.1 MMOL/L (ref 3.5–5.2)
PROT SERPL-MCNC: 6.4 G/DL (ref 6–8.5)
RBC # BLD AUTO: 4.6 10*6/MM3 (ref 3.77–5.28)
SODIUM BLD-SCNC: 141 MMOL/L (ref 136–145)
T4 FREE SERPL-MCNC: 0.92 NG/DL (ref 0.93–1.7)
TRIGL SERPL-MCNC: 47 MG/DL (ref 0–150)
TSH SERPL DL<=0.05 MIU/L-ACNC: 3.44 UIU/ML (ref 0.27–4.2)
VIT B12 BLD-MCNC: 397 PG/ML (ref 211–946)
VLDLC SERPL-MCNC: 9.4 MG/DL (ref 5–40)
WBC NRBC COR # BLD: 6.35 10*3/MM3 (ref 3.4–10.8)

## 2020-02-18 PROCEDURE — 80061 LIPID PANEL: CPT

## 2020-02-18 PROCEDURE — 80053 COMPREHEN METABOLIC PANEL: CPT

## 2020-02-18 PROCEDURE — 85025 COMPLETE CBC W/AUTO DIFF WBC: CPT

## 2020-02-18 PROCEDURE — 84439 ASSAY OF FREE THYROXINE: CPT

## 2020-02-18 PROCEDURE — 82607 VITAMIN B-12: CPT

## 2020-02-18 PROCEDURE — 83540 ASSAY OF IRON: CPT

## 2020-02-18 PROCEDURE — 84443 ASSAY THYROID STIM HORMONE: CPT

## 2020-02-18 PROCEDURE — 36415 COLL VENOUS BLD VENIPUNCTURE: CPT

## 2020-03-02 ENCOUNTER — TELEPHONE (OUTPATIENT)
Dept: FAMILY MEDICINE CLINIC | Facility: CLINIC | Age: 55
End: 2020-03-02

## 2020-03-02 RX ORDER — METHOCARBAMOL 500 MG/1
500 TABLET, FILM COATED ORAL EVERY EVENING
Qty: 30 TABLET | Refills: 11 | Status: SHIPPED | OUTPATIENT
Start: 2020-03-02 | End: 2021-04-08 | Stop reason: SDUPTHER

## 2020-03-02 NOTE — TELEPHONE ENCOUNTER
Please notify patient that I have sent a lab letter out to her today and just send a prescription for Robaxin to the pharmacy for her to try at nighttime for restless legs.

## 2020-07-22 ENCOUNTER — OFFICE VISIT (OUTPATIENT)
Dept: ORTHOPEDIC SURGERY | Facility: CLINIC | Age: 55
End: 2020-07-22

## 2020-07-22 VITALS — WEIGHT: 147 LBS | OXYGEN SATURATION: 98 % | HEIGHT: 67 IN | HEART RATE: 79 BPM | BODY MASS INDEX: 23.07 KG/M2

## 2020-07-22 DIAGNOSIS — M25.551 RIGHT HIP PAIN: Primary | ICD-10-CM

## 2020-07-22 PROCEDURE — 99203 OFFICE O/P NEW LOW 30 MIN: CPT | Performed by: ORTHOPAEDIC SURGERY

## 2020-07-22 NOTE — PROGRESS NOTES
[facility] [place] [facility] Note    Subjective     Chief Complaint   Patient presents with   • Right Hip - Pain        HPI    Cadence Georges is a 54 y.o. female who presents with right hip pain.  Onset: atraumatic and gradual in nature. The issue has been ongoing for 6 month(s). Pain is a 4/10 on the pain scale. Pain is described as aching, stabbing and shooting. Associated symptoms include pain and stiffness. The pain is worse with climbing stairs, sleeping and working; pain medication and/or NSAID improve the pain. Previous treatments have included: NSAIDS.  Pain is located near the anterior superior iliac spine, with no groin pain.    I have reviewed the following portions of the patient's history:History of Present Illness    There is no problem list on file for this patient.    Past Medical History:   Diagnosis Date   • Anxiety    • Disease of thyroid gland       Past Surgical History:   Procedure Laterality Date   • EYE SURGERY        Family History   Problem Relation Age of Onset   • Breast cancer Maternal Aunt 50        SAME AUNT   • Ovarian cancer Maternal Aunt 60        SAME AUNT   • COPD Mother    • Heart attack Father      Social History     Socioeconomic History   • Marital status:      Spouse name: Not on file   • Number of children: Not on file   • Years of education: Not on file   • Highest education level: Not on file   Tobacco Use   • Smoking status: Never Smoker   • Smokeless tobacco: Never Used   Substance and Sexual Activity   • Alcohol use: No   • Drug use: No   • Sexual activity: Yes     Birth control/protection: Other      Current Outpatient Medications on File Prior to Visit   Medication Sig Dispense Refill   • benzocaine-menthol (CHLORASEPTIC) 6-10 MG lozenge Take 1 lozenge by mouth Every 2 (Two) Hours As Needed for Sore Throat.     • citalopram (CeleXA) 20 MG tablet Take 1.5 tablets by mouth Daily. 135 tablet 3   • Conj Estrogens-Bazedoxifene 0.45-20 MG tablet Take 1 tablet  "by mouth Daily. 30 tablet 11   • Loratadine (CLARITIN) 10 MG capsule Take  by mouth.     • methocarbamol (Robaxin) 500 MG tablet Take 1 tablet by mouth Every Evening for restless legs 30 tablet 11   • penciclovir (DENAVIR) 1 % cream Apply topically to the appropriate area as directed Every 2 (Two) Hours. 5 g 0   • spironolactone (ALDACTONE) 100 MG tablet Take 1 tablet by mouth Daily. 90 tablet 3   • valACYclovir (VALTREX) 1000 MG tablet Take 1 tablet by mouth 2 (Two) Times a Day. 10 tablet 0   • valACYclovir (VALTREX) 500 MG tablet Take 4 tablets by mouth Every 12 (Twelve) Hours x1 day. 8 tablet 0   • [DISCONTINUED] ibuprofen (ADVIL,MOTRIN) 800 MG tablet Take 1 tablet by mouth Every 6 (Six) Hours As Needed for Moderate Pain  or Fever. 30 tablet 0     No current facility-administered medications on file prior to visit.       No Known Allergies     Review of Systems   Constitutional: Negative.    HENT: Negative.    Eyes: Negative.    Respiratory: Negative.    Cardiovascular: Negative.    Gastrointestinal: Negative.    Endocrine: Negative.    Genitourinary: Negative.    Musculoskeletal: Positive for arthralgias.   Skin: Negative.    Allergic/Immunologic: Negative.    Neurological: Negative.    Hematological: Negative.    Psychiatric/Behavioral: Negative.         Objective      Physical Exam  Pulse 79   Ht 170.2 cm (67.01\")   Wt 66.7 kg (147 lb)   SpO2 98%   BMI 23.02 kg/m²     Body mass index is 23.02 kg/m².    General:   Mental Status:  Alert   Appearance: Cooperative, in no acute distress   Build and Nutrition: Well-nourished well-developed female   Orientation: Alert and oriented to person, place and time   Posture: Normal   Gait: Normal    Integument:   Right hip: No skin lesions, no rash, no ecchymosis    Neurologic:   Sensation:    Right foot: Intact to light touch on the dorsal and plantar aspect   Motor:  Right lower extremity: 5/5 quadriceps, hamstrings, ankle dorsiflexors, and ankle plantar " flexors    Vascular:   Right lower extremity: 2+ dorsalis pedis pulse, prompt capillary refill    Lower Extremities:   Right Hip:    Tenderness:  None    Swelling: None    Crepitus:  None    Atrophy:  None    Range of motion:  External Rotation: 40°       Internal Rotation: 40°       Flexion:  100°       Extension:  0°   Instability:  None  Deformities:  None  Functional testing: Negative Stinchfield    No leg length discrepancy      Imaging/Studies      Imaging Results (Last 24 Hours)     Procedure Component Value Units Date/Time    XR Hip With or Without Pelvis 2 - 3 View Right [[id number]] Resulted:  07/22/20 1624     Updated:  07/22/20 1625    Narrative:       Right Hip Radiographs  Indication: right hip pain  Views: low AP pelvis and lateral of the right hip    Comparison: no prior studies available for review    Findings:   No acute bony abnormalities.  Inferior acetabular osteophyte, with   bone-on-bone contact medially, with no other unusual bony features.          Assessment and Plan     Cadence was seen today for pain.    Diagnoses and all orders for this visit:    Right hip pain  -     XR Hip With or Without Pelvis 2 - 3 View Right  -     Ambulatory Referral to Physical Therapy Evaluate and treat        1. Right hip pain        I reviewed my findings with the patient today.  She has pain in the region of the anterior superior iliac spine.  Pain is been ongoing for 6 months, following no particular injury.  Pain is intermittent.  Only mild radiographic signs of arthritis, but no irritative intra-articular hip findings on clinical examination.  At this point, we will try a course of therapy, and I will see her back in 6 weeks to ensure improvement.  I will see her back sooner for any problems.  If she still has ongoing problems, we may consider MRI of her pelvis.  Possibility of meralgia paresthetica is being considered as a potential etiology, although she does not have the classic findings.    Return in  about 6 weeks (around 9/2/2020).    Medical Decision Making  Management Options : physical/occupational therapy  Data/Risk: radiology tests and independent visualization of imaging, lab tests, or EMG/NCV      Everett Ortega MD  07/22/20  16:38    Dragon disclaimer:  Much of this encounter note is an electronic transcription/translation of spoken language to printed text. The electronic translation of spoken language may permit erroneous, or at times, nonsensical words or phrases to be inadvertently transcribed; Although I have reviewed the note for such errors, some may still exist.    Answers for HPI/ROS submitted by the patient on 7/20/2020   What is the primary reason for your visit?: Other  Please describe your symptoms.: Right hip pain  Have you had these symptoms before?: No  How long have you been having these symptoms?: Greater than 2 weeks  Please list any medications you are currently taking for this condition.: Ibuprofen or naproxen  Please describe any probable cause for these symptoms. : Arthritis??

## 2020-08-04 ENCOUNTER — TREATMENT (OUTPATIENT)
Dept: PHYSICAL THERAPY | Facility: CLINIC | Age: 55
End: 2020-08-04

## 2020-08-04 DIAGNOSIS — M25.551 HIP PAIN, CHRONIC, RIGHT: Primary | ICD-10-CM

## 2020-08-04 DIAGNOSIS — G89.29 HIP PAIN, CHRONIC, RIGHT: Primary | ICD-10-CM

## 2020-08-04 DIAGNOSIS — M70.61 TROCHANTERIC BURSITIS OF RIGHT HIP: ICD-10-CM

## 2020-08-04 PROCEDURE — 97140 MANUAL THERAPY 1/> REGIONS: CPT | Performed by: PHYSICAL THERAPIST

## 2020-08-04 PROCEDURE — 97110 THERAPEUTIC EXERCISES: CPT | Performed by: PHYSICAL THERAPIST

## 2020-08-04 PROCEDURE — 97162 PT EVAL MOD COMPLEX 30 MIN: CPT | Performed by: PHYSICAL THERAPIST

## 2020-08-04 NOTE — PROGRESS NOTES
Physical Therapy Initial Evaluation and Plan of Care      Patient: Cadence Georges   : 1965  Diagnosis/ICD-10 Code:  Trochanteric bursitis of right hip [M70.61]  Referring practitioner: Everett Ortega MD  Date of Initial Visit: 2020  Today's Date: 2020  Patient seen for 1 sessions               Subjective Evaluation    History of Present Illness  Mechanism of injury: Insidious intermittent right lateral hip pain of varying severity that started approximately 6-8 months ago. Pain will sometimes travel from right lateral hip to the lateral knee but will not go past the knee. No bowel/bladder dysfunction    Movements that make pain worse: walking up stairs, squatting, right rotation, staying in one position, sleeping     Pain does not prevent her from doing her daily activities.           Patient Occupation: Surgical tech  Quality of life: excellent    Pain  Current pain rating: 3  At best pain ratin  At worst pain ratin (work- positions that require rotation)  Quality: dull ache and sharp (sharp when going up the stairs. )  Relieving factors: medications and change in position (Ibuprofen helps, walking around tends to help)  Progression: worsening    Hand dominance: ambidextrous    Treatments  Current treatment: physical therapy  Patient Goals  Patient goals for therapy: decreased pain, increased strength, independence with ADLs/IADLs and return to sport/leisure activities             Objective          Palpation     Right   No palpable tenderness to the quadratus lumborum.   Hypertonic in the piriformis. Tenderness of the piriformis, rectus femoris, sartorius and TFL.     Additional Palpation Details  R iliopsoas tenderness and hypertonic, was able to release with manual technique.     Tenderness     Right Hip   Tenderness in the ASIS and greater trochanter. No tenderness in the iliac crest and sacroiliac joint.     Lumbar Screen  Lumbar range of motion within normal limits.    Passive Range  of Motion   Left Hip   Extension: 30 degrees     Right Hip   Flexion: WFL  Extension: 20 degrees   Abduction: WFL  Adduction: WFL  External rotation (90/90): WFL  Internal rotation (90/90): Right hip passive internal rotation 90/90: mild discomfort lateral hip. WFL    Strength/Myotome Testing     Left Hip   Normal muscle strength    Right Hip   Planes of Motion   Flexion: 4  Abduction: 4  External rotation: 4-  Internal rotation: 4+    Tests     Right Hip   Positive BRYCE.   Negative Vahid, SI compression and SI distraction.   Angel: Negative.      Additional Tests Details  SLS equal bilaterally and no sign of glute med weakness           Assessment & Plan     Assessment  Impairments: abnormal coordination, abnormal gait, abnormal muscle firing, abnormal muscle tone, abnormal or restricted ROM, activity intolerance, impaired balance, impaired physical strength, lacks appropriate home exercise program and pain with function  Assessment details: Patient is a 55 year old female presenting with right lateral hip pain that feels deep and refers to lateral thigh. Patient presents with tenderness to palpation in greater trochanter, ASIS, iliopsoas muscle, and experiences pain with hip flexion and standing internal rotation at work. Signs and symptoms are consistent with iliopsoas strain (with emphasis on iliacus) and trochanteric bursitis. Patient is appropriate for physical therapy to address above issues.   Prognosis: good  Prognosis details: Short Term Goals (3 weeks):  1. Patient will be independent with home exercise program.  2. Patient will demonstrate improved hip strength by 50%.  3. Patient will demonstrate improved hip extension by 50%    Long Term Goals (8 weeks):  1. Patient will be able to walk at least 20 minutes with hip pain no greater than 2/10.  2. Patient will demonstrate functional squat with hip pain no greater than 2/10.  3. Patient will be able to return to full work/home duty with hip pain no  greater than 2/10.    Functional Limitations: lifting, walking, uncomfortable because of pain, standing and stooping  Plan  Therapy options: will be seen for skilled physical therapy services  Planned modality interventions: ultrasound, traction, thermotherapy (hydrocollator packs), TENS, high voltage pulsed current (spasm management), high voltage pulsed current (pain management), iontophoresis and cryotherapy  Planned therapy interventions: therapeutic activities, stretching, strengthening, spinal/joint mobilization, soft tissue mobilization, postural training, neuromuscular re-education, motor coordination training, manual therapy, abdominal trunk stabilization, ADL retraining, balance/weight-bearing training, body mechanics training, fine motor coordination training, flexibility, functional ROM exercises, gait training, IADL retraining, home exercise program and joint mobilization  Frequency: 1-2x/week.  Duration in visits: 12  Treatment plan discussed with: patient        Manual Therapy:   8      mins  96687;  Therapeutic Exercise:    23     mins  27229;     Neuromuscular Stevie:        mins  55199;    Therapeutic Activity:          mins  90593;     Gait Training:           mins  18787;     Ultrasound:          mins  33124;    Electrical Stimulation:        mins  74768 ( );  Dry Needling          mins self-pay    Timed Treatment:   31   mins   Total Treatment:     53   mins            I was present in the PT Department guiding the student by approving, concurring, and confirming the skilled judgement for all services rendered.     PT SIGNATURE: Annika Aly PT   DATE TREATMENT INITIATED: 8/6/2020    Initial Certification  Certification Period: 11/4/2020  I certify that the therapy services are furnished while this patient is under my care.  The services outlined above are required by this patient, and will be reviewed every 90 days.     PHYSICIAN: Everett Ortega MD      DATE:     Please sign and return  via fax to [phone].. Thank you, [facility].

## 2020-08-11 PROCEDURE — U0003 INFECTIOUS AGENT DETECTION BY NUCLEIC ACID (DNA OR RNA); SEVERE ACUTE RESPIRATORY SYNDROME CORONAVIRUS 2 (SARS-COV-2) (CORONAVIRUS DISEASE [COVID-19]), AMPLIFIED PROBE TECHNIQUE, MAKING USE OF HIGH THROUGHPUT TECHNOLOGIES AS DESCRIBED BY CMS-2020-01-R: HCPCS | Performed by: FAMILY MEDICINE

## 2020-08-14 ENCOUNTER — TELEPHONE (OUTPATIENT)
Dept: URGENT CARE | Facility: CLINIC | Age: 55
End: 2020-08-14

## 2020-08-14 NOTE — TELEPHONE ENCOUNTER
Spoke with Pt informed lab result was not detected. Informed Pt the CDC still recommends 10 day quarantine from onset of symptoms. Pt verbalized understanding no further questions

## 2020-08-18 ENCOUNTER — TREATMENT (OUTPATIENT)
Dept: PHYSICAL THERAPY | Facility: CLINIC | Age: 55
End: 2020-08-18

## 2020-08-18 DIAGNOSIS — G89.29 HIP PAIN, CHRONIC, RIGHT: Primary | ICD-10-CM

## 2020-08-18 DIAGNOSIS — M70.61 TROCHANTERIC BURSITIS OF RIGHT HIP: ICD-10-CM

## 2020-08-18 DIAGNOSIS — M25.551 HIP PAIN, CHRONIC, RIGHT: Primary | ICD-10-CM

## 2020-08-18 PROCEDURE — 97112 NEUROMUSCULAR REEDUCATION: CPT | Performed by: PHYSICAL THERAPIST

## 2020-08-18 PROCEDURE — 97110 THERAPEUTIC EXERCISES: CPT | Performed by: PHYSICAL THERAPIST

## 2020-08-18 PROCEDURE — 97140 MANUAL THERAPY 1/> REGIONS: CPT | Performed by: PHYSICAL THERAPIST

## 2020-08-18 NOTE — PROGRESS NOTES
Visit #: 2    Subjective   Cadence Georges reports: Exercises were helpful.     Objective   See Exercise, Manual, and Modality Logs for complete treatment.       Assessment/Plan  Patient tolerated exercises and stretches very well. Worked on squat form to reduce stress to patella and lumbar.   Progress per Plan of Care           Manual Therapy:    8     mins  82738;  Therapeutic Exercise:    35     mins  78985;     Neuromuscular Stevie:  10   mins  49411;    Therapeutic Activity:          mins  25290;     Gait Training:           mins  24845;     Ultrasound:         mins  06314;   Iontophoresis          mins  67804   Electrical Stimulation:         mins  42692 ( );  Dry Needling         mins self-pay  Fluidotherapy          mins 04106    Timed Treatment:   53   mins   Total Treatment:     53   mins    Annika Aly PT  Physical Therapist

## 2020-09-03 ENCOUNTER — TREATMENT (OUTPATIENT)
Dept: PHYSICAL THERAPY | Facility: CLINIC | Age: 55
End: 2020-09-03

## 2020-09-03 DIAGNOSIS — M25.551 HIP PAIN, CHRONIC, RIGHT: Primary | ICD-10-CM

## 2020-09-03 DIAGNOSIS — G89.29 HIP PAIN, CHRONIC, RIGHT: Primary | ICD-10-CM

## 2020-09-03 DIAGNOSIS — M70.61 TROCHANTERIC BURSITIS OF RIGHT HIP: ICD-10-CM

## 2020-09-03 PROCEDURE — 97110 THERAPEUTIC EXERCISES: CPT | Performed by: PHYSICAL THERAPIST

## 2020-09-03 PROCEDURE — 97140 MANUAL THERAPY 1/> REGIONS: CPT | Performed by: PHYSICAL THERAPIST

## 2020-09-03 PROCEDURE — 97112 NEUROMUSCULAR REEDUCATION: CPT | Performed by: PHYSICAL THERAPIST

## 2020-09-03 NOTE — PROGRESS NOTES
Visit #: 3    Subjective   Cadence Georges reports: Pain has been less intense going up stairs and less frequent but still occurring. Has been doing stretches but not strengthening exercises     Objective   See Exercise, Manual, and Modality Logs for complete treatment.       Assessment/Plan  Focused on strength based exercises for hip stability, lifting, and muscle endurance for work. Patient given HEP.   Progress per Plan of Care            Manual Therapy:    8     mins  90286;  Therapeutic Exercise:    35     mins  81334;     Neuromuscular Stevie:    12    mins  37588;    Therapeutic Activity:          mins  85491;     Gait Training:           mins  69619;     Ultrasound:          mins  03562;   Iontophoresis         mins  67012   Electrical Stimulation:         mins  61172 ( );  Dry Needling          mins self-pay  Fluidotherapy          mins 61145    Timed Treatment:   55   mins   Total Treatment:     55   mins    Annika Aly PT  Physical Therapist

## 2020-09-08 ENCOUNTER — TREATMENT (OUTPATIENT)
Dept: PHYSICAL THERAPY | Facility: CLINIC | Age: 55
End: 2020-09-08

## 2020-09-08 DIAGNOSIS — M70.61 TROCHANTERIC BURSITIS OF RIGHT HIP: ICD-10-CM

## 2020-09-08 DIAGNOSIS — M25.551 HIP PAIN, CHRONIC, RIGHT: Primary | ICD-10-CM

## 2020-09-08 DIAGNOSIS — G89.29 HIP PAIN, CHRONIC, RIGHT: Primary | ICD-10-CM

## 2020-09-08 PROCEDURE — 97110 THERAPEUTIC EXERCISES: CPT | Performed by: PHYSICAL THERAPIST

## 2020-09-08 PROCEDURE — 97112 NEUROMUSCULAR REEDUCATION: CPT | Performed by: PHYSICAL THERAPIST

## 2020-09-10 NOTE — PROGRESS NOTES
Visit #: 4    Subjective   Cadence Georges reports: Has not been having pain with work or stairs. Went kayaking over weekend without issue.     Objective   See Exercise, Manual, and Modality Logs for complete treatment.       Assessment/Plan  Patient reports good improvement. Continued focus on strength today. Will consider discharge next visit if she remains to be pain free.   Anticipate DC next Visit           Manual Therapy:         mins  53971;  Therapeutic Exercise:    43     mins  69186;     Neuromuscular Stevie:    15    mins  98415;    Therapeutic Activity:          mins  10880;     Gait Training:           mins  66432;     Ultrasound:          mins  21171;   Iontophoresis          mins  21320   Electrical Stimulation:         mins  63779 ( );  Dry Needling          mins self-pay  Fluidotherapy          mins 73306    Timed Treatment:   58   mins   Total Treatment:     58   mins    Annika Aly PT  Physical Therapist

## 2020-09-17 ENCOUNTER — TREATMENT (OUTPATIENT)
Dept: PHYSICAL THERAPY | Facility: CLINIC | Age: 55
End: 2020-09-17

## 2020-09-17 DIAGNOSIS — M25.551 HIP PAIN, CHRONIC, RIGHT: Primary | ICD-10-CM

## 2020-09-17 DIAGNOSIS — G89.29 HIP PAIN, CHRONIC, RIGHT: Primary | ICD-10-CM

## 2020-09-17 DIAGNOSIS — M70.61 TROCHANTERIC BURSITIS OF RIGHT HIP: ICD-10-CM

## 2020-09-17 PROCEDURE — 97112 NEUROMUSCULAR REEDUCATION: CPT | Performed by: PHYSICAL THERAPIST

## 2020-09-17 PROCEDURE — 97110 THERAPEUTIC EXERCISES: CPT | Performed by: PHYSICAL THERAPIST

## 2020-09-17 NOTE — PROGRESS NOTES
Visit #: 5    Subjective   Cadence Georges reports: Not having pain with work or stairs. Last 3 nights has had a lot of pain at night along lateral thigh.     Objective   Mild tenderness over right greater trochanter.   Vahid test neg, pos for tenderness along lateral thigh.        See Exercise, Manual, and Modality Logs for complete treatment.       Assessment/Plan  Patient is going to continue HEP on her own for several weeks. We discussed before bed HEP routine including myofascial release along lateral thigh using foam roller.   Other  Hold therapy for 2 weeks, patient will continue HEP and return as needed.          Manual Therapy:         mins  43984;  Therapeutic Exercise:    42     mins  27219;     Neuromuscular Stevie:    15    mins  66356;    Therapeutic Activity:          mins  66653;     Gait Training:           mins  63859;     Ultrasound:          mins  55829;   Iontophoresis          mins  87456   Electrical Stimulation:         mins  87631 ( );  Dry Needling          mins self-pay  Fluidotherapy         mins 21049    Timed Treatment:   57   mins   Total Treatment:     57   mins    Annika Aly PT  Physical Therapist

## 2020-10-12 NOTE — TELEPHONE ENCOUNTER
Called patient and she is having hot flashes off the Duavee.    Per Yandel Fernandez is substituting Premarin and prometrium

## 2020-10-13 NOTE — TELEPHONE ENCOUNTER
Pt. States duavee is out of stock and needs something until it comes back in. S/w pt on the phone and she verified pharmacy.

## 2020-10-15 ENCOUNTER — TELEPHONE (OUTPATIENT)
Dept: OBSTETRICS AND GYNECOLOGY | Facility: CLINIC | Age: 55
End: 2020-10-15

## 2020-10-15 NOTE — TELEPHONE ENCOUNTER
Patient said they calling progestrone and estrogen and wanted to verify the mg of both, the pharmacy wanted to, due to it being a sub for another script, and is also wondering about prumpro?

## 2020-10-16 NOTE — TELEPHONE ENCOUNTER
There was 2 rx of Prometrium sent in to pt pharmacy wondering which Rx is correct, informed the 200mg was the correct Rx. D/c. The 100mg.

## 2020-10-20 ENCOUNTER — TELEPHONE (OUTPATIENT)
Dept: OBSTETRICS AND GYNECOLOGY | Facility: CLINIC | Age: 55
End: 2020-10-20

## 2020-11-24 RX ORDER — CITALOPRAM 20 MG/1
30 TABLET ORAL DAILY
Qty: 135 TABLET | Refills: 0 | Status: SHIPPED | OUTPATIENT
Start: 2020-11-24 | End: 2021-01-20 | Stop reason: SDUPTHER

## 2020-11-24 RX ORDER — SPIRONOLACTONE 100 MG/1
100 TABLET, FILM COATED ORAL DAILY
Qty: 90 TABLET | Refills: 0 | Status: SHIPPED | OUTPATIENT
Start: 2020-11-24 | End: 2021-01-20 | Stop reason: SDUPTHER

## 2020-11-24 NOTE — TELEPHONE ENCOUNTER
Patient was scheduled with [facility] for her annual today which had to be rescheduled for January. She wants to discuss her prescriptions with a nurse. She will need refills before the end of the year and doesn't know which ones she should get refilled before her annual.

## 2020-11-24 NOTE — TELEPHONE ENCOUNTER
Pt would like 90 days refill for the Celexa 1 1/2 pills and Spironolactone 100 mg but leave the hormones #30 with 2 fills in case the Duavee becomes available - [facility]

## 2021-01-20 RX ORDER — SPIRONOLACTONE 100 MG/1
100 TABLET, FILM COATED ORAL DAILY
Qty: 90 TABLET | Refills: 0 | Status: SHIPPED | OUTPATIENT
Start: 2021-01-20 | End: 2021-04-08 | Stop reason: SDUPTHER

## 2021-01-20 RX ORDER — CITALOPRAM 20 MG/1
30 TABLET ORAL DAILY
Qty: 135 TABLET | Refills: 0 | Status: SHIPPED | OUTPATIENT
Start: 2021-01-20 | End: 2021-04-08 | Stop reason: SDUPTHER

## 2021-01-20 NOTE — TELEPHONE ENCOUNTER
Call forwarded to voice mail.  She had an appointment tomorrow and canceled it as her dad is in [facility] very sick.  She rescheduled for 4/21

## 2021-02-18 ENCOUNTER — IMMUNIZATION (OUTPATIENT)
Dept: VACCINE CLINIC | Facility: HOSPITAL | Age: 56
End: 2021-02-18

## 2021-02-18 PROCEDURE — 0011A: CPT | Performed by: INTERNAL MEDICINE

## 2021-02-18 PROCEDURE — 91301 HC SARSCO02 VAC 100MCG/0.5ML IM: CPT | Performed by: INTERNAL MEDICINE

## 2021-03-18 ENCOUNTER — IMMUNIZATION (OUTPATIENT)
Dept: VACCINE CLINIC | Facility: HOSPITAL | Age: 56
End: 2021-03-18

## 2021-03-18 PROCEDURE — 0012A: CPT | Performed by: INTERNAL MEDICINE

## 2021-03-18 PROCEDURE — 91301 HC SARSCO02 VAC 100MCG/0.5ML IM: CPT | Performed by: INTERNAL MEDICINE

## 2021-03-22 ENCOUNTER — TELEPHONE (OUTPATIENT)
Dept: FAMILY MEDICINE CLINIC | Facility: CLINIC | Age: 56
End: 2021-03-22

## 2021-03-22 RX ORDER — VALACYCLOVIR HYDROCHLORIDE 1 G/1
1000 TABLET, FILM COATED ORAL 2 TIMES DAILY
Qty: 10 TABLET | Refills: 0 | Status: SHIPPED | OUTPATIENT
Start: 2021-03-22 | End: 2021-09-03

## 2021-03-22 NOTE — TELEPHONE ENCOUNTER
PT SAID RECEIVED HER SECOND COVID VACCINE ON MARCH 18.  SHE SAID SHE NOW HAS A LARGE FEVER BLISTER.  PT IS REQUESTING A REFILL OF VALTREX AND HAVE IT SENT TO [facility].

## 2021-04-08 ENCOUNTER — OFFICE VISIT (OUTPATIENT)
Dept: OBSTETRICS AND GYNECOLOGY | Facility: CLINIC | Age: 56
End: 2021-04-08

## 2021-04-08 VITALS
HEIGHT: 67 IN | WEIGHT: 159.2 LBS | DIASTOLIC BLOOD PRESSURE: 62 MMHG | SYSTOLIC BLOOD PRESSURE: 94 MMHG | BODY MASS INDEX: 24.99 KG/M2

## 2021-04-08 DIAGNOSIS — Z79.890 HORMONE REPLACEMENT THERAPY: ICD-10-CM

## 2021-04-08 DIAGNOSIS — Z12.39 ENCOUNTER FOR BREAST CANCER SCREENING USING NON-MAMMOGRAM MODALITY: ICD-10-CM

## 2021-04-08 DIAGNOSIS — Z01.419 ENCOUNTER FOR ANNUAL ROUTINE GYNECOLOGICAL EXAMINATION: Primary | ICD-10-CM

## 2021-04-08 PROCEDURE — 99396 PREV VISIT EST AGE 40-64: CPT | Performed by: OBSTETRICS & GYNECOLOGY

## 2021-04-08 RX ORDER — SPIRONOLACTONE 100 MG/1
100 TABLET, FILM COATED ORAL DAILY
Qty: 90 TABLET | Refills: 3 | Status: SHIPPED | OUTPATIENT
Start: 2021-04-08 | End: 2022-04-14 | Stop reason: SDUPTHER

## 2021-04-08 RX ORDER — CITALOPRAM 20 MG/1
30 TABLET ORAL DAILY
Qty: 135 TABLET | Refills: 3 | Status: SHIPPED | OUTPATIENT
Start: 2021-04-08 | End: 2022-04-14 | Stop reason: SDUPTHER

## 2021-04-08 NOTE — PROGRESS NOTES
GYN Annual Exam     CC - Here for annual exam.     Subjective   HPI  Cadence Georges is a 55 y.o. female, , who presents for annual well woman exam.  She is postmenopausal. Her last LMP was No LMP recorded. Patient has had an ablation. Patient reports problems with: mild vaginal dryness (lubricant helps).  Partner Status: Marital Status: .  New Partners since last visit: no Desires STD Screening: no    She is planning to wean off the HRT and see how she does now that the [place] is unavailable.    Last mammogram:   Last Completed Mammogram       Status Date      MAMMOGRAM Done 2019 MAMMO DIAGNOSTIC DIGITAL TOMOSYNTHESIS LEFT W CAD     Patient has more history with this topic...        Last colonoscopy:   Last Completed Colonoscopy       Status Date      COLONOSCOPY Done 2018         Last DEXA: patient reports she's never had one    Last Pap :   Last Completed Pap Smear       Status Date      PAP SMEAR Done 10/29/2018 negative, HPV negative        History of abnormal Pap smear: yes - led to LEEP    Additional OB/GYN History   Current contraception: contraceptive methods: Post menopausal status  Family history of uterine, colon, breast, or ovarian cancer: yes - MAunt breast and ovarian cancer, MGM uterine cancer  Performs monthly Self-Breast Exam: every other month  Parental Hip Fracture: no  Exercises Regularly: yes - once  Feelings of Anxiety or Depression: yes - anxiety controlled with meds    Tobacco Usage?: No   OB History        1    Para   1    Term   1       0    AB   0    Living   1       SAB   0    TAB   0    Ectopic   0    Molar   0    Multiple   0    Live Births   1                Health Maintenance   Topic Date Due   • HEPATITIS C SCREENING  Never done   • ZOSTER VACCINE (2 of 2) 2017   • MAMMOGRAM  2021   • ANNUAL PHYSICAL  2021   • TDAP/TD VACCINES (1 - Tdap) 2021 (Originally 1986)   • INFLUENZA VACCINE  2021   • PAP SMEAR  10/29/2021  "  • Annual Gynecologic Pelvic and Breast Exam  04/09/2022   • COLONOSCOPY  09/17/2028   • COVID-19 Vaccine  Completed   • Pneumococcal Vaccine 0-64  Aged Out   • MENINGOCOCCAL VACCINE  Aged Out       The additional following portions of the patient's history were reviewed and updated as appropriate: allergies, current medications, past family history, past medical history, past social history, past surgical history and problem list.    Review of Systems   Genitourinary:        Mild vaginal dryness   Psychiatric/Behavioral: Nervous/anxious: controlled with meds.    All other systems reviewed and are negative.      I have reviewed and agree with the HPI, ROS, and historical information as entered above. Sindi Fernandez MD    Objective   BP 94/62 (BP Location: Right arm, Patient Position: Sitting, Cuff Size: Adult)   Ht 170.2 cm (67\")   Wt 72.2 kg (159 lb 3.2 oz)   Breastfeeding No   BMI 24.93 kg/m²     Physical Exam  Vitals and nursing note reviewed. Exam conducted with a chaperone present.   Constitutional:       Appearance: She is well-developed.   HENT:      Head: Normocephalic and atraumatic.   Neck:      Thyroid: No thyroid mass or thyromegaly.   Cardiovascular:      Rate and Rhythm: Normal rate and regular rhythm.      Heart sounds: No murmur heard.     Pulmonary:      Effort: Pulmonary effort is normal. No retractions.      Breath sounds: Normal breath sounds. No wheezing, rhonchi or rales.   Chest:      Chest wall: No mass or tenderness.      Breasts:         Right: Normal. No mass, nipple discharge, skin change or tenderness.         Left: Normal. No mass, nipple discharge, skin change or tenderness.   Abdominal:      General: Bowel sounds are normal.      Palpations: Abdomen is soft. Abdomen is not rigid. There is no mass.      Tenderness: There is no abdominal tenderness. There is no guarding.      Hernia: No hernia is present. There is no hernia in the left inguinal area.   Genitourinary:     Labia:    "      Right: No rash, tenderness or lesion.         Left: No rash, tenderness or lesion.       Vagina: Normal. No vaginal discharge or lesions.      Cervix: No cervical motion tenderness, discharge, lesion or cervical bleeding.      Uterus: Normal. Not enlarged, not fixed and not tender.       Adnexa:         Right: No mass or tenderness.          Left: No mass or tenderness.        Rectum: No external hemorrhoid.   Musculoskeletal:      Cervical back: Normal range of motion. No muscular tenderness.   Neurological:      Mental Status: She is alert and oriented to person, place, and time.   Psychiatric:         Behavior: Behavior normal.           Assessment/Plan     Encounter Diagnoses   Name Primary?   • Encounter for annual routine gynecological examination Yes   • Encounter for breast cancer screening using non-mammogram modality    • Hormone replacement therapy        Recommended use of Vitamin D replacement and getting adequate calcium in her diet. (1500mg)  Reviewed monthly self breast exams.  Instructed to call with lumps, pain, or breast discharge.    Continue yearly mammography  Reviewed HPV guidelines.  Reviewed exercise as a preventative health measures.   Reviewed risks/benefits of HRT, including possible increased risk of breast cancer, heart disease, blood clots and strokes. She plans to wean off the HRT at this point.  Labs today  Refill of Celexa given - doing well.         Sindi Fernandez MD   04/08/2021

## 2021-04-09 LAB
25(OH)D3+25(OH)D2 SERPL-MCNC: 74.5 NG/ML (ref 30–100)
ALBUMIN SERPL-MCNC: 4.3 G/DL (ref 3.8–4.9)
ALBUMIN/GLOB SERPL: 1.7 {RATIO} (ref 1.2–2.2)
ALP SERPL-CCNC: 73 IU/L (ref 39–117)
ALT SERPL-CCNC: 10 IU/L (ref 0–32)
AST SERPL-CCNC: 17 IU/L (ref 0–40)
BILIRUB SERPL-MCNC: 0.3 MG/DL (ref 0–1.2)
BUN SERPL-MCNC: 14 MG/DL (ref 6–24)
BUN/CREAT SERPL: 16 (ref 9–23)
CALCIUM SERPL-MCNC: 9.4 MG/DL (ref 8.7–10.2)
CHLORIDE SERPL-SCNC: 103 MMOL/L (ref 96–106)
CO2 SERPL-SCNC: 25 MMOL/L (ref 20–29)
CREAT SERPL-MCNC: 0.9 MG/DL (ref 0.57–1)
ERYTHROCYTE [DISTWIDTH] IN BLOOD BY AUTOMATED COUNT: 11.7 % (ref 11.7–15.4)
GLOBULIN SER CALC-MCNC: 2.6 G/DL (ref 1.5–4.5)
GLUCOSE SERPL-MCNC: 86 MG/DL (ref 65–99)
HBA1C MFR BLD: 5.1 % (ref 4.8–5.6)
HCT VFR BLD AUTO: 39.7 % (ref 34–46.6)
HCV AB S/CO SERPL IA: <0.1 S/CO RATIO (ref 0–0.9)
HGB BLD-MCNC: 13.1 G/DL (ref 11.1–15.9)
MCH RBC QN AUTO: 30.1 PG (ref 26.6–33)
MCHC RBC AUTO-ENTMCNC: 33 G/DL (ref 31.5–35.7)
MCV RBC AUTO: 91 FL (ref 79–97)
PLATELET # BLD AUTO: 267 X10E3/UL (ref 150–450)
POTASSIUM SERPL-SCNC: 4.6 MMOL/L (ref 3.5–5.2)
PROT SERPL-MCNC: 6.9 G/DL (ref 6–8.5)
RBC # BLD AUTO: 4.35 X10E6/UL (ref 3.77–5.28)
SODIUM SERPL-SCNC: 141 MMOL/L (ref 134–144)
T4 FREE SERPL-MCNC: 1.12 NG/DL (ref 0.82–1.77)
TSH SERPL DL<=0.005 MIU/L-ACNC: 2.45 UIU/ML (ref 0.45–4.5)
WBC # BLD AUTO: 6.8 X10E3/UL (ref 3.4–10.8)

## 2021-04-12 RX ORDER — METHOCARBAMOL 500 MG/1
500 TABLET, FILM COATED ORAL EVERY EVENING
Qty: 30 TABLET | Refills: 3 | Status: SHIPPED | OUTPATIENT
Start: 2021-04-12 | End: 2021-09-06 | Stop reason: SDUPTHER

## 2021-04-15 DIAGNOSIS — Z01.419 ENCOUNTER FOR ANNUAL ROUTINE GYNECOLOGICAL EXAMINATION: ICD-10-CM

## 2021-06-01 ENCOUNTER — HOSPITAL ENCOUNTER (OUTPATIENT)
Dept: MAMMOGRAPHY | Facility: HOSPITAL | Age: 56
Discharge: HOME | End: 2021-06-01
Admitting: OBSTETRICS & GYNECOLOGY

## 2021-06-01 DIAGNOSIS — Z01.419 ENCOUNTER FOR ANNUAL ROUTINE GYNECOLOGICAL EXAMINATION: ICD-10-CM

## 2021-06-01 PROCEDURE — 77067 SCR MAMMO BI INCL CAD: CPT

## 2021-06-01 PROCEDURE — 77063 BREAST TOMOSYNTHESIS BI: CPT

## 2021-06-01 PROCEDURE — 77063 BREAST TOMOSYNTHESIS BI: CPT | Performed by: RADIOLOGY

## 2021-06-01 PROCEDURE — 77067 SCR MAMMO BI INCL CAD: CPT | Performed by: RADIOLOGY

## 2021-06-14 ENCOUNTER — TELEPHONE (OUTPATIENT)
Dept: OBSTETRICS AND GYNECOLOGY | Facility: CLINIC | Age: 56
End: 2021-06-14

## 2021-06-14 RX ORDER — PROGESTERONE 200 MG/1
200 CAPSULE ORAL DAILY
Qty: 90 CAPSULE | Refills: 0 | Status: SHIPPED | OUTPATIENT
Start: 2021-06-14 | End: 2021-09-03

## 2021-06-14 RX ORDER — PROGESTERONE 200 MG/1
200 CAPSULE ORAL DAILY
Qty: 90 CAPSULE | Refills: 2 | Status: SHIPPED | OUTPATIENT
Start: 2021-06-14 | End: 2022-09-26

## 2021-06-14 NOTE — TELEPHONE ENCOUNTER
Pt. Calls states discussed weaning HRT with  at annual visit in April but hot flashes have gotten worse and wants to remain on HRT for now. She denies h/o blood clotting disorders or HTN. Non smoker    She is on premarin 0.45 mg qd and prometrium 200mg daily.

## 2021-09-03 ENCOUNTER — OFFICE VISIT (OUTPATIENT)
Dept: FAMILY MEDICINE CLINIC | Facility: CLINIC | Age: 56
End: 2021-09-03

## 2021-09-03 VITALS
TEMPERATURE: 96.9 F | BODY MASS INDEX: 24.64 KG/M2 | WEIGHT: 157 LBS | HEART RATE: 75 BPM | OXYGEN SATURATION: 96 % | HEIGHT: 67 IN | SYSTOLIC BLOOD PRESSURE: 110 MMHG | DIASTOLIC BLOOD PRESSURE: 80 MMHG

## 2021-09-03 DIAGNOSIS — B37.2 MONILIAL INTERTRIGO: Primary | ICD-10-CM

## 2021-09-03 PROCEDURE — 99213 OFFICE O/P EST LOW 20 MIN: CPT | Performed by: PHYSICIAN ASSISTANT

## 2021-09-03 RX ORDER — NYSTATIN 100000 [USP'U]/G
POWDER TOPICAL 2 TIMES DAILY
Qty: 30 G | Refills: 0 | Status: SHIPPED | OUTPATIENT
Start: 2021-09-03 | End: 2021-09-07 | Stop reason: SDUPTHER

## 2021-09-03 RX ORDER — FLUCONAZOLE 200 MG/1
200 TABLET ORAL DAILY
Qty: 7 TABLET | Refills: 0 | Status: SHIPPED | OUTPATIENT
Start: 2021-09-03 | End: 2022-04-14

## 2021-09-03 NOTE — PROGRESS NOTES
Subjective   Cadence Georges is a 56 y.o. female  rash under breast area      History of Present Illness  Patient is a pleasant 56-year-old female comes in today concerned with a rash under her right breast since Tuesday.  She tried lotrimin cream as well as cortisone cream without any improvement she thinks may be worsening.  Goldbond powder does help with symptom relief it is itching.  She is leaving to go out of town next Thursday on vacation to [place].  The following portions of the patient's history were reviewed and updated as appropriate: allergies, current medications, past social history and problem list    Review of Systems   Constitutional: Negative for fever.   Respiratory: Negative.    Cardiovascular: Negative.    Musculoskeletal: Negative for arthralgias.   Skin: Positive for color change and rash. Negative for pallor and wound.       Objective     Vitals:    09/03/21 1453   BP: 110/80   Pulse: 75   Temp: 96.9 °F (36.1 °C)   SpO2: 96%       Physical Exam  Vitals and nursing note reviewed.   Constitutional:       General: She is not in acute distress.     Appearance: Normal appearance. She is well-developed. She is not ill-appearing, toxic-appearing or diaphoretic.   HENT:      Head: Normocephalic and atraumatic.   Skin:     General: Skin is warm and dry.      Coloration: Skin is not pale.      Findings: Erythema and rash present.      Comments: Erythematous macular rash extending under the entire right breast measuring approximate 7 cm in length consistent with monilial dermatitis   Neurological:      Mental Status: She is alert.         Assessment/Plan     Diagnoses and all orders for this visit:    1. Monilial intertrigo (Primary)    Other orders  -     fluconazole (Diflucan) 200 MG tablet; Take 1 tablet by mouth Daily.  Dispense: 7 tablet; Refill: 0  -     nystatin (MYCOSTATIN) 781236 UNIT/GM powder; Apply  topically to the appropriate area as directed 2 (Two) Times a Day.  Dispense: 30 g; Refill:  0

## 2021-09-07 ENCOUNTER — TELEPHONE (OUTPATIENT)
Dept: FAMILY MEDICINE CLINIC | Facility: CLINIC | Age: 56
End: 2021-09-07

## 2021-09-07 ENCOUNTER — TELEMEDICINE (OUTPATIENT)
Dept: FAMILY MEDICINE CLINIC | Facility: CLINIC | Age: 56
End: 2021-09-07

## 2021-09-07 DIAGNOSIS — L23.9 ALLERGIC CONTACT DERMATITIS, UNSPECIFIED TRIGGER: Primary | ICD-10-CM

## 2021-09-07 PROCEDURE — 99213 OFFICE O/P EST LOW 20 MIN: CPT | Performed by: PHYSICIAN ASSISTANT

## 2021-09-07 RX ORDER — NYSTATIN 100000 [USP'U]/G
POWDER TOPICAL 2 TIMES DAILY
Qty: 30 G | Refills: 0 | Status: SHIPPED | OUTPATIENT
Start: 2021-09-07 | End: 2022-04-14

## 2021-09-07 RX ORDER — METHYLPREDNISOLONE 4 MG/1
TABLET ORAL
Qty: 21 EACH | Refills: 0 | Status: SHIPPED | OUTPATIENT
Start: 2021-09-07 | End: 2022-04-14

## 2021-09-07 RX ORDER — FLUOCINONIDE GEL 0.5 MG/G
GEL TOPICAL 2 TIMES DAILY
Qty: 30 G | Refills: 0 | Status: SHIPPED | OUTPATIENT
Start: 2021-09-07 | End: 2022-09-26

## 2021-09-07 NOTE — PROGRESS NOTES
Subjective   Cadence Georges is a 56 y.o. female  Rash      History of Present Illness  Patient is a 56-year-old female.Patient presents and consents for telehealth/video visit examination.  Patient is following up on persistent rash on her right breast, on right collarbone and nasal bridge.  Please see previous office notes.  No significant improvement other than minimal relief with nystatin powder under breast.  She states rash under breast has started weeping and crusting.  Video visit 15 minutes in length.  The following portions of the patient's history were reviewed and updated as appropriate: allergies, current medications, past social history and problem list    Review of Systems   Constitutional: Negative for chills, diaphoresis, fatigue and fever.   Respiratory: Negative.    Cardiovascular: Negative.    Gastrointestinal: Negative.    Musculoskeletal: Negative.    Skin: Positive for rash. Negative for color change.       Objective     There were no vitals filed for this visit.    Physical Exam  Constitutional:       General: She is not in acute distress.     Appearance: Normal appearance. She is well-developed. She is not ill-appearing, toxic-appearing or diaphoretic.   HENT:      Head: Normocephalic and atraumatic.   Skin:     General: Skin is warm and dry.      Coloration: Skin is not pale.      Findings: Erythema and rash present.      Comments: Examination of skin over virtual visit reveals persistent erythematous rash under right breast with evidence of crusting into areas, no significant improvement noted, to the right of her breast on torso there is an erythematous linear maculopapular rash also.  Continue to see maculopapular erythematous rash over right collarbone and on nasal bridge as well.   Neurological:      Mental Status: She is alert.         Assessment/Plan     Diagnoses and all orders for this visit:    1. Allergic contact dermatitis, unspecified trigger (Primary)    Other orders  -      nystatin (MYCOSTATIN) 680035 UNIT/GM powder; Apply  topically to the appropriate area as directed 2 (Two) Times a Day.  Dispense: 30 g; Refill: 0  -     methylPREDNISolone (MEDROL) 4 MG dose pack; Take as directed on package instructions.  Dispense: 21 each; Refill: 0  -     fluocinonide (LIDEX) 0.05 % gel; Apply  topically to the appropriate area as directed 2 (Two) Times a Day.  Dispense: 30 g; Refill: 0

## 2021-09-09 RX ORDER — METHOCARBAMOL 500 MG/1
500 TABLET, FILM COATED ORAL EVERY EVENING
Qty: 30 TABLET | Refills: 3 | Status: SHIPPED | OUTPATIENT
Start: 2021-09-09 | End: 2022-06-27 | Stop reason: SDUPTHER

## 2021-09-17 ENCOUNTER — TELEPHONE (OUTPATIENT)
Dept: FAMILY MEDICINE CLINIC | Facility: CLINIC | Age: 56
End: 2021-09-17

## 2021-09-17 DIAGNOSIS — R21 RASH: Primary | ICD-10-CM

## 2021-09-17 NOTE — TELEPHONE ENCOUNTER
Caller: Cadence Georges    Relationship: Self    Best call back number:     [phone]     What medication are you requesting:     ACYCLOVIR    What are your current symptoms:     COLD SORE    How long have you been experiencing symptoms:     PATIENT STATED SHE STILL HAS SMALL RASHES OVER BODY    Have you had these symptoms before:    [x] Yes  [] No    Have you been treated for these symptoms before:   [x] Yes  [] No    If a prescription is needed, what is your preferred pharmacy and phone number:      [facility]    TELEPHONE CONTACT:    [phone]    Additional notes:    N/A    MOIZ BLUES

## 2021-09-17 NOTE — TELEPHONE ENCOUNTER
Patient was seen twice earlier this month for rash initially we thought that it was herpetic and used valacyclovir but my understanding is that did not help the rash so I switched to a steroid treatment for her.  Is she wanting acyclovir for cold sores or for this rash?

## 2021-09-20 RX ORDER — VALACYCLOVIR HYDROCHLORIDE 1 G/1
1000 TABLET, FILM COATED ORAL 2 TIMES DAILY
Qty: 14 TABLET | Refills: 0 | Status: SHIPPED | OUTPATIENT
Start: 2021-09-20 | End: 2022-09-12 | Stop reason: SDUPTHER

## 2021-09-20 NOTE — TELEPHONE ENCOUNTER
LVm that medication was sent to pharm per Shelli and referral was placed to dermatology      OK FOR HUB TO RELAY MESSAGE IF PATIENT CALLS BACK

## 2021-09-20 NOTE — TELEPHONE ENCOUNTER
Patient states she does have a cold sore and still have the rash, she is getting more rash patches. She is hoping the acyclovir would help both issues

## 2022-03-17 ENCOUNTER — TELEPHONE (OUTPATIENT)
Dept: FAMILY MEDICINE CLINIC | Facility: CLINIC | Age: 57
End: 2022-03-17

## 2022-03-17 NOTE — TELEPHONE ENCOUNTER
Weight/BMI needs to be 30 or greater before I prescribe this medication.  If her weight meets this criteria and she is interested in trying this medication she will need to come in person for an office appointment for further evaluation and sign a controlled substance agreement before we can initiate treatment.

## 2022-03-17 NOTE — TELEPHONE ENCOUNTER
----- Message from Cadence Georges sent at 3/16/2022  9:43 PM EDT -----  Regarding: Prescription request   Hello! My daughter is taking Phentermine and having good weight loss results with it. I was wondering if you could prescribe it for me? Is it something I would need to come in for? Just let me know what I need to do.   Thank you very much!   Cadence

## 2022-04-14 ENCOUNTER — OFFICE VISIT (OUTPATIENT)
Dept: OBSTETRICS AND GYNECOLOGY | Facility: CLINIC | Age: 57
End: 2022-04-14

## 2022-04-14 VITALS
BODY MASS INDEX: 24.33 KG/M2 | HEIGHT: 67 IN | SYSTOLIC BLOOD PRESSURE: 122 MMHG | DIASTOLIC BLOOD PRESSURE: 82 MMHG | WEIGHT: 155 LBS

## 2022-04-14 DIAGNOSIS — Z12.39 ENCOUNTER FOR BREAST CANCER SCREENING USING NON-MAMMOGRAM MODALITY: ICD-10-CM

## 2022-04-14 DIAGNOSIS — Z01.419 WOMEN'S ANNUAL ROUTINE GYNECOLOGICAL EXAMINATION: Primary | ICD-10-CM

## 2022-04-14 DIAGNOSIS — Z79.890 HORMONE REPLACEMENT THERAPY: ICD-10-CM

## 2022-04-14 DIAGNOSIS — Z78.0 POSTMENOPAUSAL STATUS: ICD-10-CM

## 2022-04-14 PROCEDURE — 99396 PREV VISIT EST AGE 40-64: CPT | Performed by: OBSTETRICS & GYNECOLOGY

## 2022-04-14 RX ORDER — SPIRONOLACTONE 100 MG/1
100 TABLET, FILM COATED ORAL DAILY
Qty: 90 TABLET | Refills: 3 | Status: SHIPPED | OUTPATIENT
Start: 2022-04-14 | End: 2023-02-27 | Stop reason: SDUPTHER

## 2022-04-14 RX ORDER — CITALOPRAM 20 MG/1
30 TABLET ORAL DAILY
Qty: 135 TABLET | Refills: 3 | Status: SHIPPED | OUTPATIENT
Start: 2022-04-14 | End: 2023-02-27 | Stop reason: SDUPTHER

## 2022-04-14 NOTE — PROGRESS NOTES
GYN Annual Exam     CC - Here for annual exam.     Subjective   HPI  Cadence Georges is a 56 y.o. female, , who presents for annual well woman exam.  She is postmenopausal. No LMP recorded. Patient has had an ablation..    Patient reports problems with: none.  She is currently weaning off her HRT. Partner Status: Marital Status: .  New Partners since last visit: no Desires STD Screening: no    Last mammogram:   Last Completed Mammogram          MAMMOGRAM (Yearly) Next due on 2021  Mammo Screening Digital Tomosynthesis Bilateral With CAD    2019  Mammo Diagnostic Digital Tomosynthesis Left With CAD    2018  Mammo Screening Digital Tomosynthesis Bilateral With CAD    2017  Mammo Screening Digital Tomosynthesis Bilateral With CAD    2016  Mammo Screening Digital Tomosynthesis Bilateral With CAD    Only the first 5 history entries have been loaded, but more history exists.              Last colonoscopy:   Last Completed Colonoscopy          COLORECTAL CANCER SCREENING (COLONOSCOPY - Every 10 Years) Next due on 2018  COLONOSCOPY (Done)              Last DEXA: never   Last Pap :   Last Completed Pap Smear          Ordered - PAP SMEAR (Every 3 Years) Ordered on 4/15/2021    2021  SCANNED - PAP SMEAR    10/29/2018  Done - negative, HPV negative              History of abnormal Pap smear: yes - h/o LEEP    Additional OB/GYN History   Current contraception: contraceptive methods: Post menopausal status  Desires to: do not start contraception  Family history of uterine, colon, breast, or ovarian cancer: yes - Breast and Uterine  Performs monthly Self-Breast Exam: occ  Parental Hip Fracture: none  Exercises Regularly: no  Feelings of Anxiety or Depression: no    Tobacco Usage?: No   OB History        1    Para   1    Term   1       0    AB   0    Living   1       SAB   0    IAB   0    Ectopic   0    Molar   0    Multiple   0    Live  "Births   1                Health Maintenance   Topic Date Due   • DXA SCAN  Never done   • TDAP/TD VACCINES (1 - Tdap) Never done   • ZOSTER VACCINE (2 of 2) 07/24/2017   • ANNUAL PHYSICAL  02/08/2021   • COVID-19 Vaccine (3 - Booster for Moderna series) 08/18/2021   • Annual Gynecologic Pelvic and Breast Exam  04/09/2022   • MAMMOGRAM  06/01/2022   • INFLUENZA VACCINE  08/01/2022   • PAP SMEAR  04/08/2024   • COLORECTAL CANCER SCREENING  09/17/2028   • HEPATITIS C SCREENING  Completed   • Pneumococcal Vaccine 0-64  Aged Out       The additional following portions of the patient's history were reviewed and updated as appropriate: allergies, current medications, past family history, past medical history, past social history, past surgical history and problem list.    Review of Systems   Constitutional: Negative.    HENT: Negative.    Eyes: Negative.    Respiratory: Negative.    Cardiovascular: Negative.    Gastrointestinal: Negative.    Endocrine: Negative.    Genitourinary: Negative.    Musculoskeletal: Negative.    Skin: Negative.    Allergic/Immunologic: Negative.    Neurological: Negative.    Hematological: Negative.    Psychiatric/Behavioral: Negative.        I have reviewed and agree with the HPI, ROS, and historical information as entered above. Sindi Fernandez MD    Objective   /82   Ht 170.2 cm (67\")   Wt 70.3 kg (155 lb)   BMI 24.28 kg/m²     Physical Exam  Vitals and nursing note reviewed. Exam conducted with a chaperone present.   Constitutional:       Appearance: She is well-developed.   HENT:      Head: Normocephalic and atraumatic.   Neck:      Thyroid: No thyroid mass or thyromegaly.   Cardiovascular:      Rate and Rhythm: Normal rate and regular rhythm.      Heart sounds: No murmur heard.  Pulmonary:      Effort: Pulmonary effort is normal. No retractions.      Breath sounds: Normal breath sounds. No wheezing, rhonchi or rales.   Chest:      Chest wall: No mass or tenderness.   Breasts:      " Right: Normal. No mass, nipple discharge, skin change or tenderness.      Left: Normal. No mass, nipple discharge, skin change or tenderness.       Abdominal:      General: Bowel sounds are normal.      Palpations: Abdomen is soft. Abdomen is not rigid. There is no mass.      Tenderness: There is no abdominal tenderness. There is no guarding.      Hernia: No hernia is present. There is no hernia in the left inguinal area.   Genitourinary:     Labia:         Right: No rash, tenderness or lesion.         Left: No rash, tenderness or lesion.       Vagina: Normal. No vaginal discharge or lesions.      Cervix: No cervical motion tenderness, discharge, lesion or cervical bleeding.      Uterus: Normal. Not enlarged, not fixed and not tender.       Adnexa:         Right: No mass or tenderness.          Left: No mass or tenderness.        Rectum: No external hemorrhoid.   Musculoskeletal:      Cervical back: Normal range of motion. No muscular tenderness.   Neurological:      Mental Status: She is alert and oriented to person, place, and time.   Psychiatric:         Behavior: Behavior normal.           Assessment/Plan     Encounter Diagnoses   Name Primary?   • Women's annual routine gynecological examination Yes   • Encounter for breast cancer screening using non-mammogram modality    • Postmenopausal status    • Hormone replacement therapy        Recommended use of Vitamin D replacement and getting adequate calcium in her diet. (1500mg)  Reviewed monthly self breast exams.  Instructed to call with lumps, pain, or breast discharge.    Continue yearly mammography  Reviewed HPV guidelines.  Reviewed exercise as a preventative health measures.   Reviewed risks/benefits of HRT including increased risk of breast cancer, VTE and heart disease.  There is evidence HRT decreases hip fracture, colon cancer and all cause mortality. She is currently weaning off her HRT.  Discussed bone health.  VINH - doing well on  Celexa.  Spironolactone - will check labs.        Sindi Fenrandez MD   04/14/2022

## 2022-04-15 LAB
ALBUMIN SERPL-MCNC: 4.4 G/DL (ref 3.5–5.2)
ALBUMIN/GLOB SERPL: 1.8 G/DL
ALP SERPL-CCNC: 69 U/L (ref 39–117)
ALT SERPL-CCNC: 14 U/L (ref 1–33)
AST SERPL-CCNC: 13 U/L (ref 1–32)
BILIRUB SERPL-MCNC: 0.2 MG/DL (ref 0–1.2)
BUN SERPL-MCNC: 19 MG/DL (ref 6–20)
BUN/CREAT SERPL: 19.6 (ref 7–25)
CALCIUM SERPL-MCNC: 9.8 MG/DL (ref 8.6–10.5)
CHLORIDE SERPL-SCNC: 102 MMOL/L (ref 98–107)
CO2 SERPL-SCNC: 25.8 MMOL/L (ref 22–29)
CREAT SERPL-MCNC: 0.97 MG/DL (ref 0.57–1)
EGFRCR SERPLBLD CKD-EPI 2021: 69.6 ML/MIN/1.73
ERYTHROCYTE [DISTWIDTH] IN BLOOD BY AUTOMATED COUNT: 11.7 % (ref 12.3–15.4)
GLOBULIN SER CALC-MCNC: 2.5 GM/DL
GLUCOSE SERPL-MCNC: 91 MG/DL (ref 65–99)
HBA1C MFR BLD: 5.4 % (ref 4.8–5.6)
HCT VFR BLD AUTO: 41.3 % (ref 34–46.6)
HGB BLD-MCNC: 13.7 G/DL (ref 12–15.9)
MCH RBC QN AUTO: 30.3 PG (ref 26.6–33)
MCHC RBC AUTO-ENTMCNC: 33.2 G/DL (ref 31.5–35.7)
MCV RBC AUTO: 91.4 FL (ref 79–97)
PLATELET # BLD AUTO: 264 10*3/MM3 (ref 140–450)
POTASSIUM SERPL-SCNC: 4 MMOL/L (ref 3.5–5.2)
PROT SERPL-MCNC: 6.9 G/DL (ref 6–8.5)
RBC # BLD AUTO: 4.52 10*6/MM3 (ref 3.77–5.28)
SODIUM SERPL-SCNC: 139 MMOL/L (ref 136–145)
TSH SERPL DL<=0.005 MIU/L-ACNC: 2.52 UIU/ML (ref 0.27–4.2)
WBC # BLD AUTO: 7.38 10*3/MM3 (ref 3.4–10.8)

## 2022-06-24 RX ORDER — METHOCARBAMOL 500 MG/1
500 TABLET, FILM COATED ORAL EVERY EVENING
Qty: 30 TABLET | Refills: 3 | Status: CANCELLED | OUTPATIENT
Start: 2022-06-24

## 2022-06-27 RX ORDER — METHOCARBAMOL 500 MG/1
500 TABLET, FILM COATED ORAL EVERY EVENING
Qty: 30 TABLET | Refills: 6 | Status: SHIPPED | OUTPATIENT
Start: 2022-06-27

## 2022-06-27 NOTE — TELEPHONE ENCOUNTER
----- Message from Cadence Georges sent at 6/24/2022  9:40 PM EDT -----  Regarding: methocarbamol 500 MG tablet  Could you please refill this for me? It is for my nighttime leg cramps, restless legs.   Thanks so much!

## 2022-08-17 ENCOUNTER — TELEPHONE (OUTPATIENT)
Dept: OBSTETRICS AND GYNECOLOGY | Facility: CLINIC | Age: 57
End: 2022-08-17

## 2022-08-17 NOTE — TELEPHONE ENCOUNTER
Dr. Fernandez patient    S/w patient- patient states that she was seen in OV with LOS on 04/14/2022 and discuss weaning HRT. Patient states that she was weaning due risk factors. Patient states that she was weaning in hopes that her hot flashes and night sweats would improve. Patient states that she is having horrible hot flashes and would like to go back on HRT. Patient was previously on Prometrium 200mg PO capsules. Informed patient that I will discuss this with LOS and CB with plan of care. She v/u.

## 2022-08-19 RX ORDER — ESTRADIOL 0.05 MG/D
1 FILM, EXTENDED RELEASE TRANSDERMAL 2 TIMES WEEKLY
Qty: 8 PATCH | Refills: 7 | Status: SHIPPED | OUTPATIENT
Start: 2022-08-22 | End: 2023-02-27 | Stop reason: SDUPTHER

## 2022-08-19 RX ORDER — PROGESTERONE 100 MG/1
100 CAPSULE ORAL DAILY
Qty: 28 CAPSULE | Refills: 7 | Status: SHIPPED | OUTPATIENT
Start: 2022-08-19 | End: 2023-02-27 | Stop reason: SDUPTHER

## 2022-08-22 ENCOUNTER — TELEPHONE (OUTPATIENT)
Dept: OBSTETRICS AND GYNECOLOGY | Facility: CLINIC | Age: 57
End: 2022-08-22

## 2022-08-23 ENCOUNTER — TELEPHONE (OUTPATIENT)
Dept: OBSTETRICS AND GYNECOLOGY | Facility: CLINIC | Age: 57
End: 2022-08-23

## 2022-09-12 RX ORDER — VALACYCLOVIR HYDROCHLORIDE 1 G/1
1000 TABLET, FILM COATED ORAL 2 TIMES DAILY
Qty: 14 TABLET | Refills: 0 | Status: SHIPPED | OUTPATIENT
Start: 2022-09-12

## 2022-09-26 ENCOUNTER — OFFICE VISIT (OUTPATIENT)
Dept: ORTHOPEDIC SURGERY | Facility: CLINIC | Age: 57
End: 2022-09-26

## 2022-09-26 VITALS
WEIGHT: 155 LBS | SYSTOLIC BLOOD PRESSURE: 122 MMHG | BODY MASS INDEX: 24.33 KG/M2 | DIASTOLIC BLOOD PRESSURE: 72 MMHG | HEIGHT: 67 IN

## 2022-09-26 DIAGNOSIS — M79.672 FOOT PAIN, BILATERAL: Primary | ICD-10-CM

## 2022-09-26 DIAGNOSIS — M79.671 FOOT PAIN, BILATERAL: Primary | ICD-10-CM

## 2022-09-26 PROCEDURE — 99213 OFFICE O/P EST LOW 20 MIN: CPT | Performed by: ORTHOPAEDIC SURGERY

## 2022-09-26 NOTE — PROGRESS NOTES
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  She has a history of several years of increasing bilateral foot pain.  It is worse at the end of a long day at work.  She has aching and throbbing.  The right is worse than the left.  On the right is centered under the fifth metatarsal base.  She also has had some trochanteric bursitis.  She has changed shoes.  She is tried some over-the-counter shoe inserts.  She is here for advice on what else to do.  She does wear compression socks.  Soaking and stretching do help somewhat.  She has less pain with less activity.            Current Outpatient Medications on File Prior to Visit   Medication Sig Dispense Refill   • citalopram (CeleXA) 20 MG tablet Take 1.5 tablets by mouth Daily. 135 tablet 3   • estradiol (Vivelle-Dot) 0.05 MG/24HR patch Place 1 patch on the skin as directed by provider 2 (Two) Times a Week. 8 patch 7   • methocarbamol (Robaxin) 500 MG tablet Take 1 tablet by mouth Every Evening. 30 tablet 6   • Progesterone (Prometrium) 100 MG capsule Take 1 capsule by mouth Daily. 28 capsule 7   • spironolactone (ALDACTONE) 100 MG tablet Take 1 tablet by mouth Daily. 90 tablet 3   • valACYclovir (Valtrex) 1000 MG tablet Take 1 tablet by mouth 2 (Two) Times a Day. 14 tablet 0   • [DISCONTINUED] estrogens, conjugated, (Premarin) 0.45 MG tablet Take 1 tablet by mouth Daily for 270 days. 90 tablet 2   • [DISCONTINUED] fluocinonide (LIDEX) 0.05 % gel Apply  topically to the appropriate area as directed 2 (Two) Times a Day. 30 g 0   • [DISCONTINUED] Progesterone (Prometrium) 200 MG capsule Take 1 capsule by mouth Daily. 90 capsule 2     No current  facility-administered medications on file prior to visit.      No Known Allergies   Past Medical History:   Diagnosis Date   • Anxiety disorder    • History of abnormal cervical Papanicolaou smear    • History of bone density study     Done @ Dr. Sears's open house   • History of cold sores      Past Surgical History:   Procedure Laterality Date   • COLPOSCOPY W/ BIOPSY / CURETTAGE  2005    TREV II, negative ECC   • ENDOMETRIAL ABLATION  2008    Dr. Kothari's Office   • EYE SURGERY      radiokeratotomy   • LEEP  2005    squamous metaplasia, neg margins   • SEPTOPLASTY       Family History   Problem Relation Age of Onset   • Breast cancer Maternal Aunt 50        SAME AUNT   • Ovarian cancer Maternal Aunt 60        SAME AUNT   • Diabetes Maternal Aunt    • COPD Mother    • Heart attack Father    • Heart disease Father    • Uterine cancer Maternal Grandmother    • Diabetes Paternal Uncle       Social History     Socioeconomic History   • Marital status:    Tobacco Use   • Smoking status: Former Smoker     Years: 6.00     Quit date:      Years since quittin.7   • Smokeless tobacco: Never Used   Vaping Use   • Vaping Use: Never used   Substance and Sexual Activity   • Alcohol use: No   • Drug use: No   • Sexual activity: Yes     Partners: Male     Birth control/protection: Post-menopausal        Review of Systems   Constitutional: Negative.    HENT: Negative.    Eyes: Negative.    Respiratory: Negative.    Cardiovascular: Negative.    Gastrointestinal: Negative.    Endocrine: Negative.    Genitourinary: Negative.    Musculoskeletal: Positive for arthralgias.   Skin: Negative.    Allergic/Immunologic: Negative.    Neurological: Negative.    Hematological: Negative.    Psychiatric/Behavioral: Negative.        The following portions of the patient's history were reviewed and updated as appropriate: allergies, current medications, past family history, past medical history, past social  "history, past surgical history and problem list.    Physical Exam:   /72   Ht 170.2 cm (67.01\")   Wt 70.3 kg (155 lb)   BMI 24.27 kg/m²   GENERAL: Body habitus: normal weight for height    Lower extremity edema: Right: none; Left: none    Varicose veins:  Right: mild; Left: mild    Gait: normal     Mental Status:  awake and alert; oriented to person, place, and time    Voice:  clear  SKIN:  Lower extremity: Normal    Hair Growth(lower extremity):  Right:normal; Left:  normal  NAILS: Toenails: normal  HEENT: Head: Normocephalic, atraumatic,  without obvious abnormality.  eye: normal external eye, no icterus  ears:normal external ears  PULM:  Repiratory effort normal  CV:  Dorsalis Pedis:  Right: 2+; Left:2+    Posterior Tibial: Right:2+; Left:2+    Capillary Refill:  Brisk  MSK:  Hand:left handed      Tibia:  Right:  non tender; Left:  non tender      Ankle:  Right: non tender; Left:  non tender      Foot:  Right:  Mildly tender under the base of the fifth metatarsal, otherwise nontender, normal arch in stance, no significant varus nor valgus; Left:  Nontender, normal arch, no significant varus valgus      NEURO: Heel Walking:  Right:  normal; Left:  normal    Toe Walking:  Right:  normal; Left:  normal     [place] 5.07 monofilament test: normal    Lower extremity sensation: intact           Calf Atrophy:none    Motor Function: all 5/5         Medical Decision Making    Data Review:   ordered and reviewed x-rays today    Assessment and Plan/ Diagnosis/Treatment options:   1. Foot pain, bilateral  I do not see any significant deformity, I think she does probably have some bursitis at the base of the fifth metatarsal.  I think most of her symptoms are due to her prolonged standing for her job.  She does wear compression socks so that is a good thing.  We talked about various shoes.  I explained that its really trial and error to find the ones that are most comfortable for you.  I also recommend " changing them during the week.  I do think some custom orthotics would be helpful.  She was glad to know there was no serious abnormality on the x-ray.  I will be happy to see her anytime  - XR Foot 2 View Bilateral            Part of this encounter note is an electronic transcription/translation of spoken language to printed text. The electronic translation of spoken language may permit erroneous, or at times, nonsensical words or phrases to be inadvertently transcribed; Although I have reviewed the note for such errors, some may still exist.          Rachelle Rivas MD

## 2023-01-23 ENCOUNTER — TELEMEDICINE (OUTPATIENT)
Dept: FAMILY MEDICINE CLINIC | Facility: TELEHEALTH | Age: 58
End: 2023-01-23
Payer: COMMERCIAL

## 2023-01-23 DIAGNOSIS — J32.9 SINUSITIS, UNSPECIFIED CHRONICITY, UNSPECIFIED LOCATION: Primary | ICD-10-CM

## 2023-01-23 PROCEDURE — BHEMPVIDEOVISIT: Performed by: NURSE PRACTITIONER

## 2023-01-23 RX ORDER — FLUCONAZOLE 150 MG/1
TABLET ORAL
Qty: 2 TABLET | Refills: 0 | Status: SHIPPED | OUTPATIENT
Start: 2023-01-23

## 2023-01-23 RX ORDER — PREDNISONE 10 MG/1
10 TABLET ORAL DAILY
Qty: 5 TABLET | Refills: 0 | Status: SHIPPED | OUTPATIENT
Start: 2023-01-23 | End: 2023-01-28

## 2023-01-23 RX ORDER — AZITHROMYCIN 250 MG/1
TABLET, FILM COATED ORAL
Qty: 6 TABLET | Refills: 0 | Status: SHIPPED | OUTPATIENT
Start: 2023-01-23

## 2023-01-23 NOTE — PROGRESS NOTES
Chief Complaint   Patient presents with   • Sinusitis     Covid over a week ago but having severe sinus symptoms       Subjective   Cadence Georges is a 57 y.o. female.     History of Present Illness  Covid diagnosed on 2023 after onset of sinus pain and pressure starting on 2023. She says that the exam did show sinus infectiuon and ear fluid but thought related to Covid. She has been taking OTC cold medications including mucinex and decongestants without relief. She had some improvement toward the end of last week and the weekend but over the last couple of days has woke up with blood in the back of her throat with thick mucus that is hard to cough up. She has facial pain and pressure bilaterally along the maxillary sinuses. No fever, chills, wheezing or shortness of breath.  Sinusitis  The current episode started 1 to 4 weeks ago. The problem has been gradually worsening since onset. There has been no fever. Associated symptoms include congestion, coughing, headaches, a hoarse voice, sinus pressure and a sore throat. Pertinent negatives include no chills or shortness of breath. Past treatments include oral decongestants and acetaminophen. The treatment provided no relief.       The following portions of the patient's history were reviewed and updated as appropriate: allergies, current medications, past medical history, and problem list.      Past Medical History:   Diagnosis Date   • Anxiety disorder    • History of abnormal cervical Papanicolaou smear    • History of bone density study     Done @ Dr. Sears's open [place]   • History of cold sores      Social History     Socioeconomic History   • Marital status:    Tobacco Use   • Smoking status: Former     Years: 6.00     Types: Cigarettes     Quit date:      Years since quittin.0   • Smokeless tobacco: Never   Vaping Use   • Vaping Use: Never used   Substance and Sexual Activity   • Alcohol use: No   • Drug use: No   • Sexual  activity: Yes     Partners: Male     Birth control/protection: Post-menopausal     medication documentation: reviewed and updated with patient and   Current Outpatient Medications:   •  citalopram (CeleXA) 20 MG tablet, Take 1.5 tablets by mouth Daily., Disp: 135 tablet, Rfl: 3  •  estradiol (Vivelle-Dot) 0.05 MG/24HR patch, Place 1 patch on the skin as directed by provider 2 (Two) Times a Week., Disp: 8 patch, Rfl: 7  •  Progesterone (Prometrium) 100 MG capsule, Take 1 capsule by mouth Daily., Disp: 28 capsule, Rfl: 7  •  spironolactone (ALDACTONE) 100 MG tablet, Take 1 tablet by mouth Daily., Disp: 90 tablet, Rfl: 3  •  azithromycin (Zithromax Z-Guido) 250 MG tablet, Take 2 tabs on Day 1, then 1 tab daily for 4 additional days, Disp: 6 tablet, Rfl: 0  •  fluconazole (DIFLUCAN) 150 MG tablet, Take 1 tablet by mouth Daily for 1 day., Disp: 1 tablet, Rfl: 2  •  fluconazole (Diflucan) 150 MG tablet, 150 mg x 1 dose and may repeat in 3 days if needed., Disp: 2 tablet, Rfl: 0  •  methocarbamol (Robaxin) 500 MG tablet, Take 1 tablet by mouth Every Evening., Disp: 30 tablet, Rfl: 6  •  predniSONE (DELTASONE) 10 MG tablet, Take 1 tablet by mouth Daily for 5 days., Disp: 5 tablet, Rfl: 0  •  valACYclovir (Valtrex) 1000 MG tablet, Take 1 tablet by mouth 2 (Two) Times a Day., Disp: 14 tablet, Rfl: 0  Review of Systems   Constitutional: Positive for fatigue. Negative for chills and fever.   HENT: Positive for congestion, hoarse voice, postnasal drip, sinus pressure, sinus pain, sore throat and voice change.    Respiratory: Positive for cough. Negative for shortness of breath.    Neurological: Positive for headaches.       Objective   Physical Exam  Constitutional:       Appearance: Normal appearance. She is well-developed.   HENT:      Nose: Congestion present.      Right Sinus: Maxillary sinus tenderness present.      Left Sinus: Maxillary sinus tenderness present.   Eyes:      Conjunctiva/sclera: Conjunctivae normal.    Pulmonary:      Effort: Pulmonary effort is normal.   Lymphadenopathy:      Head:      Right side of head: No tonsillar, preauricular or posterior auricular adenopathy.      Left side of head: No tonsillar, preauricular or posterior auricular adenopathy.      Cervical: No cervical adenopathy (patient guided exam).   Psychiatric:         Speech: Speech normal.         Assessment & Plan   Diagnoses and all orders for this visit:    1. Sinusitis, unspecified chronicity, unspecified location (Primary)  -     azithromycin (Zithromax Z-Guido) 250 MG tablet; Take 2 tabs on Day 1, then 1 tab daily for 4 additional days  Dispense: 6 tablet; Refill: 0  -     fluconazole (Diflucan) 150 MG tablet; 150 mg x 1 dose and may repeat in 3 days if needed.  Dispense: 2 tablet; Refill: 0  -     predniSONE (DELTASONE) 10 MG tablet; Take 1 tablet by mouth Daily for 5 days.  Dispense: 5 tablet; Refill: 0                    Take antibiotic until gone. May take probiotic with antibiotic if prone to antibiotic induced diarrhea. Other medications that may be helpful are: Flonase will help with nasal congestion, but takes several days to become fully effective and is not a fast acting medication. It is also recommended to start and continue Claritin, Zyrtec or Allegra daily to control postnasal drainage, if you are prone to reactive sinus issues due to weather or seasonal changes. Cool mist humidifier at bedside will help secretions remain thin and more easily drain, relieving the pressure in your sinuses.     FOLLOW UP  Follow up with PCP, Urgent Care or Video Visit if symptoms have not resolved in 7-10 days. If symptoms worsen, go to Urgent Care or follow up with PCP. If you develop high fever, chest pain, shortness of breath or any life-threatening symptoms, go to nearest Emergency Department.          The use of  Video Visit was utilized during this visit, using both [employer] and [employer]. The use of a video visit has been reviewed with  the patient and verbal informed consent has been obtained. No technical difficulties occurred during the visit.    is located at [address] [place] [id number]  Provider is located at [place], [place]

## 2023-01-23 NOTE — PATIENT INSTRUCTIONS
Take antibiotic until gone. May take probiotic with antibiotic if prone to antibiotic induced diarrhea. Other medications that may be helpful are: Flonase will help with nasal congestion, but takes several days to become fully effective and is not a fast acting medication. It is also recommended to start and continue Claritin, Zyrtec or Allegra daily to control postnasal drainage, if you are prone to reactive sinus issues due to weather or seasonal changes. Cool mist humidifier at bedside will help secretions remain thin and more easily drain, relieving the pressure in your sinuses.

## 2023-02-21 NOTE — TELEPHONE ENCOUNTER
Pt stated she believes she needs to go back on these as her symptoms have worsened    Normal vision: sees adequately in most situations; can see medication labels, newsprint

## 2023-02-27 ENCOUNTER — PATIENT MESSAGE (OUTPATIENT)
Dept: OBSTETRICS AND GYNECOLOGY | Facility: CLINIC | Age: 58
End: 2023-02-27
Payer: COMMERCIAL

## 2023-02-27 RX ORDER — ESTRADIOL 0.05 MG/D
1 FILM, EXTENDED RELEASE TRANSDERMAL 2 TIMES WEEKLY
Qty: 8 PATCH | Refills: 1 | Status: SHIPPED | OUTPATIENT
Start: 2023-02-27 | End: 2023-02-27

## 2023-02-27 RX ORDER — SPIRONOLACTONE 100 MG/1
100 TABLET, FILM COATED ORAL DAILY
Qty: 90 TABLET | Refills: 0 | Status: SHIPPED | OUTPATIENT
Start: 2023-02-27

## 2023-02-27 RX ORDER — PROGESTERONE 100 MG/1
100 CAPSULE ORAL DAILY
Qty: 28 CAPSULE | Refills: 1 | Status: SHIPPED | OUTPATIENT
Start: 2023-02-27 | End: 2023-02-27

## 2023-02-27 RX ORDER — CITALOPRAM 20 MG/1
30 TABLET ORAL DAILY
Qty: 135 TABLET | Refills: 0 | Status: SHIPPED | OUTPATIENT
Start: 2023-02-27

## 2023-02-27 RX ORDER — ESTRADIOL 0.05 MG/D
1 FILM, EXTENDED RELEASE TRANSDERMAL 2 TIMES WEEKLY
Qty: 8 PATCH | Refills: 1 | Status: SHIPPED | OUTPATIENT
Start: 2023-02-27

## 2023-02-27 RX ORDER — CITALOPRAM 20 MG/1
30 TABLET ORAL DAILY
Qty: 135 TABLET | Refills: 0 | Status: SHIPPED | OUTPATIENT
Start: 2023-02-27 | End: 2023-02-27

## 2023-02-27 RX ORDER — PROGESTERONE 100 MG/1
100 CAPSULE ORAL DAILY
Qty: 28 CAPSULE | Refills: 1 | Status: SHIPPED | OUTPATIENT
Start: 2023-02-27

## 2023-02-27 NOTE — TELEPHONE ENCOUNTER
From: Cadence Georges  To: Sindi Fernandez  Sent: 2/27/2023 1:31 PM EST  Subject: Medication refills    Hello  My annual exam is scheduled April 20. I will need some refills before then. I’ll also include others in this message as well.     Progesterone 100MG - could I get a 90 day refill supply? Less trips to pharmacy would be nice.     Citalopram 20MG (135 tablets) no refills left after current refill     Spironolactone 100MG - only 1 refill left     Methocarbamol 500MG - prescribed by PCP for night leg cramps, could you fill this as well??     Lilian patch- will need refilled eventually     I get all but the Lilian patch at [facility] and Lilian could be transferred there as well.   Thank you and I’ll see you on April 20! Have a wonderful day.     Cadence

## 2023-04-25 ENCOUNTER — PATIENT MESSAGE (OUTPATIENT)
Dept: OBSTETRICS AND GYNECOLOGY | Facility: CLINIC | Age: 58
End: 2023-04-25

## 2023-04-25 ENCOUNTER — OFFICE VISIT (OUTPATIENT)
Dept: OBSTETRICS AND GYNECOLOGY | Facility: CLINIC | Age: 58
End: 2023-04-25
Payer: COMMERCIAL

## 2023-04-25 VITALS
SYSTOLIC BLOOD PRESSURE: 100 MMHG | BODY MASS INDEX: 24.96 KG/M2 | HEIGHT: 67 IN | WEIGHT: 159 LBS | DIASTOLIC BLOOD PRESSURE: 68 MMHG

## 2023-04-25 DIAGNOSIS — Z01.419 WOMEN'S ANNUAL ROUTINE GYNECOLOGICAL EXAMINATION: Primary | ICD-10-CM

## 2023-04-25 DIAGNOSIS — Z79.890 HORMONE REPLACEMENT THERAPY: ICD-10-CM

## 2023-04-25 DIAGNOSIS — Z12.39 ENCOUNTER FOR BREAST CANCER SCREENING USING NON-MAMMOGRAM MODALITY: ICD-10-CM

## 2023-04-25 DIAGNOSIS — Z78.0 POSTMENOPAUSAL STATUS: ICD-10-CM

## 2023-04-25 RX ORDER — PROGESTERONE 100 MG/1
100 CAPSULE ORAL DAILY
Qty: 90 CAPSULE | Refills: 3 | Status: SHIPPED | OUTPATIENT
Start: 2023-04-25 | End: 2024-04-24

## 2023-04-25 RX ORDER — ESTRADIOL 0.05 MG/D
1 FILM, EXTENDED RELEASE TRANSDERMAL 2 TIMES WEEKLY
Qty: 8 PATCH | Refills: 11 | Status: SHIPPED | OUTPATIENT
Start: 2023-04-27

## 2023-04-25 RX ORDER — CITALOPRAM 20 MG/1
30 TABLET ORAL DAILY
Qty: 135 TABLET | Refills: 3 | Status: SHIPPED | OUTPATIENT
Start: 2023-04-25 | End: 2024-04-24

## 2023-04-25 RX ORDER — CYCLOBENZAPRINE HCL 10 MG
1 TABLET ORAL 3 TIMES DAILY
COMMUNITY
Start: 2023-04-04

## 2023-04-25 RX ORDER — MELOXICAM 15 MG/1
1 TABLET ORAL DAILY
COMMUNITY
Start: 2023-04-04

## 2023-04-25 RX ORDER — CONJUGATED ESTROGENS/BAZEDOXIFENE .45; 2 MG/1; MG/1
1 TABLET, FILM COATED ORAL DAILY
Qty: 30 TABLET | Refills: 11 | Status: SHIPPED | OUTPATIENT
Start: 2023-04-25

## 2023-04-25 RX ORDER — SPIRONOLACTONE 100 MG/1
100 TABLET, FILM COATED ORAL DAILY
Qty: 90 TABLET | Refills: 3 | Status: SHIPPED | OUTPATIENT
Start: 2023-04-25 | End: 2024-04-24

## 2023-04-25 NOTE — TELEPHONE ENCOUNTER
From: Cadence Georges  To: Sindi Fernandez  Sent: 4/25/2023 4:42 PM EDT  Subject: Duavee     Dr Fernandez   My pharmacy called and said my insurance will not approve the Duavee, they need prior authorization please. I guess we figured this would happen.   Thanks and have a great day!   Cadence

## 2023-04-25 NOTE — PROGRESS NOTES
Gynecologic Annual Exam Note        GYN Annual Exam     CC - Here for annual exam.        HPI  Cadence Georges is a 57 y.o. female, , who presents for annual well woman exam as a established patient. She is postmenopausal. Denies vaginal bleeding. Patient reports problems with: none. There were no changes to her medical or surgical history since her last visit.. Partner Status: Marital Status: . She is is sexually active. She has not had new partners. STD testing recommendations have been explained to the patient and she does not desire STD testing.    Additional OB/GYN History   On HRT? Yes. Details: Estradiol 0.05MG and Prometrium 100MG     Last Pap : 2021. Results: negative. HPV: negative.   Last Completed Pap Smear          PAP SMEAR (Every 3 Years) Next due on 2021  SCANNED - PAP SMEAR    10/29/2018  Done - negative, HPV negative              History of abnormal Pap smear: yes - h/o LEEP in 2004  Family history of uterine, colon, breast, or ovarian cancer: yes - MA- breast and ovarian CA and MGM- uterine CA  Performs monthly Self-Breast Exam: no  Last mammogram: 2021. Done at .  Last Completed Mammogram     This patient has no relevant Health Maintenance data.        Last colonoscopy: has had a colonoscopy 5 year(s) ago.  Last Completed Colonoscopy          COLORECTAL CANCER SCREENING (COLONOSCOPY - Every 10 Years) Next due on 2018  COLONOSCOPY (Done)                  Last bone density scan (DEXA): None  Exercises Regularly: yes  Feelings of Anxiety or Depression: yes - Celexa 20MG      Tobacco Usage?: No       Current Outpatient Medications:   •  citalopram (CeleXA) 20 MG tablet, Take 1.5 tablets by mouth Daily., Disp: 135 tablet, Rfl: 3  •  cyclobenzaprine (FLEXERIL) 10 MG tablet, Take 1 tablet by mouth 3 (Three) Times a Day., Disp: , Rfl:   •  [START ON 2023] estradiol (Vivelle-Dot) 0.05 MG/24HR patch, Place 1 patch on the skin as  directed by provider 2 (Two) Times a Week., Disp: 8 patch, Rfl: 11  •  fluconazole (DIFLUCAN) 150 MG tablet, Take 1 tablet by mouth Daily for 1 day., Disp: 1 tablet, Rfl: 2  •  meloxicam (MOBIC) 15 MG tablet, Take 1 tablet by mouth Daily., Disp: , Rfl:   •  methocarbamol (Robaxin) 500 MG tablet, Take 1 tablet by mouth Every Evening., Disp: 30 tablet, Rfl: 6  •  Progesterone (Prometrium) 100 MG capsule, Take 1 capsule by mouth Daily., Disp: 90 capsule, Rfl: 3  •  spironolactone (ALDACTONE) 100 MG tablet, Take 1 tablet by mouth Daily., Disp: 90 tablet, Rfl: 3  •  valACYclovir (Valtrex) 1000 MG tablet, Take 1 tablet by mouth 2 (Two) Times a Day., Disp: 14 tablet, Rfl: 0  •  Conj Estrogens-Bazedoxifene (Duavee) 0.45-20 MG tablet, Take 1 tablet by mouth Daily., Disp: 30 tablet, Rfl: 11    Patient is requesting refills of Estradiol, Prometrium, Spironolactone, and Celexa.    OB History        1    Para   1    Term   1       0    AB   0    Living   1       SAB   0    IAB   0    Ectopic   0    Molar   0    Multiple   0    Live Births   1                Past Medical History:   Diagnosis Date   • Anxiety disorder    • History of abnormal cervical Papanicolaou smear    • History of bone density study     Done @ Dr. Sears's open house   • History of cold sores         Past Surgical History:   Procedure Laterality Date   • CERVICAL BIOPSY  W/ LOOP ELECTRODE EXCISION  ?   • COLPOSCOPY W/ BIOPSY / CURETTAGE  2005    TREV II, negative ECC   • ENDOMETRIAL ABLATION  2008    Dr. Kothari's Office   • EYE SURGERY      radiokeratotomy   • LEEP  2005    squamous metaplasia, neg margins   • SEPTOPLASTY         Health Maintenance   Topic Date Due   • DXA SCAN  Never done   • TDAP/TD VACCINES (1 - Tdap) Never done   • ZOSTER VACCINE (2 of 2) 2017   • ANNUAL PHYSICAL  2021   • COVID-19 Vaccine (3 - Booster for Moderna series) 2021   • MAMMOGRAM  2022   • INFLUENZA VACCINE  2023  "  • PAP SMEAR  04/08/2024   • Annual Gynecologic Pelvic and Breast Exam  04/26/2024   • COLORECTAL CANCER SCREENING  09/17/2028   • HEPATITIS C SCREENING  Completed   • Pneumococcal Vaccine 0-64  Aged Out       The additional following portions of the patient's history were reviewed and updated as appropriate: allergies, current medications, past family history, past medical history, past social history, past surgical history and problem list.    Review of Systems   Constitutional: Negative.    HENT: Negative.    Eyes: Negative.    Respiratory: Negative.    Cardiovascular: Negative.    Gastrointestinal: Negative.    Endocrine: Negative.    Genitourinary: Negative.    Musculoskeletal: Negative.    Skin: Negative.    Allergic/Immunologic: Negative.    Neurological: Negative.    Hematological: Negative.    Psychiatric/Behavioral: Negative.        I have reviewed and agree with the HPI, ROS, and historical information as entered above. Sindi Fernandez MD    Objective   /68   Ht 170.2 cm (67\")   Wt 72.1 kg (159 lb)   BMI 24.90 kg/m²     Physical Exam  Vitals and nursing note reviewed. Exam conducted with a chaperone present.   Constitutional:       Appearance: She is well-developed.   HENT:      Head: Normocephalic and atraumatic.   Neck:      Thyroid: No thyroid mass or thyromegaly.   Cardiovascular:      Rate and Rhythm: Normal rate and regular rhythm.      Heart sounds: No murmur heard.  Pulmonary:      Effort: Pulmonary effort is normal. No retractions.      Breath sounds: Normal breath sounds. No wheezing, rhonchi or rales.   Chest:      Chest wall: No mass or tenderness.   Breasts:     Right: Normal. No mass, nipple discharge, skin change or tenderness.      Left: Normal. No mass, nipple discharge, skin change or tenderness.   Abdominal:      General: Bowel sounds are normal.      Palpations: Abdomen is soft. Abdomen is not rigid. There is no mass.      Tenderness: There is no abdominal tenderness. There " is no guarding.      Hernia: No hernia is present. There is no hernia in the left inguinal area.   Genitourinary:     Labia:         Right: No rash, tenderness or lesion.         Left: No rash, tenderness or lesion.       Vagina: Normal. No vaginal discharge or lesions.      Cervix: No cervical motion tenderness, discharge, lesion or cervical bleeding.      Uterus: Normal. Not enlarged, not fixed and not tender.       Adnexa:         Right: No mass or tenderness.          Left: No mass or tenderness.        Rectum: No external hemorrhoid.   Musculoskeletal:      Cervical back: Normal range of motion. No muscular tenderness.   Neurological:      Mental Status: She is alert and oriented to person, place, and time.   Psychiatric:         Behavior: Behavior normal.            Assessment and Plan    Problem List Items Addressed This Visit    None  Visit Diagnoses     Women's annual routine gynecological examination    -  Primary    Encounter for breast cancer screening using non-mammogram modality        Relevant Orders    Mammo Screening Digital Tomosynthesis Bilateral With CAD    CBC (No Diff)    Comprehensive Metabolic Panel    Hemoglobin A1c    T4, Free    TSH    Vitamin D,25-Hydroxy    Postmenopausal status        Hormone replacement therapy              1. GYN annual well woman exam.   2. Recommended use of Vitamin D replacement and getting adequate calcium in her diet. (1500mg)  3. Reviewed monthly self breast exams.  Instructed to call with lumps, pain, or breast discharge.    4. Continue yearly mammography  5. Reviewed HPV guidelines.  6. Reviewed exercise as a preventative health measures.   7. Reviewed risks/benefits of HRT including increased risk of breast cancer, VTE and heart disease.  There is evidence HRT decreases hip fracture, colon cancer and all cause mortality.  Patient strongly desires to stay on or start HRT.  She understands she will use the lowest dose that adequately controls her symptoms.   Duavee is back on market and she would like to go back to reduce her breast cancer risk.  Will send in and send PA if necessary.    8. Return in about 1 year (around 4/25/2024), or if symptoms worsen or fail to improve.     Sindi Fernandez MD  04/25/2023

## 2023-04-25 NOTE — TELEPHONE ENCOUNTER
PA is pending on Cover my meds. Now Cheryl is Approved today PA Case: [id number], Status: Approved, Coverage Starts on: 4/25/2023 12:00:00 AM, Coverage Ends on: 4/24/2024 12:00:00 AM. 80 for 30 day supply. Per pharmacy its on long term backorder. Pt was notified and she was given a refill of her patch anyway.

## 2023-04-26 LAB
25(OH)D3+25(OH)D2 SERPL-MCNC: 94.9 NG/ML (ref 30–100)
ALBUMIN SERPL-MCNC: 4.5 G/DL (ref 3.5–5.2)
ALBUMIN/GLOB SERPL: 2 G/DL
ALP SERPL-CCNC: 73 U/L (ref 39–117)
ALT SERPL-CCNC: 9 U/L (ref 1–33)
AST SERPL-CCNC: 15 U/L (ref 1–32)
BILIRUB SERPL-MCNC: 0.3 MG/DL (ref 0–1.2)
BUN SERPL-MCNC: 15 MG/DL (ref 6–20)
BUN/CREAT SERPL: 15.5 (ref 7–25)
CALCIUM SERPL-MCNC: 9.7 MG/DL (ref 8.6–10.5)
CHLORIDE SERPL-SCNC: 101 MMOL/L (ref 98–107)
CO2 SERPL-SCNC: 27.1 MMOL/L (ref 22–29)
CREAT SERPL-MCNC: 0.97 MG/DL (ref 0.57–1)
EGFRCR SERPLBLD CKD-EPI 2021: 69.2 ML/MIN/1.73
ERYTHROCYTE [DISTWIDTH] IN BLOOD BY AUTOMATED COUNT: 11.2 % (ref 12.3–15.4)
GLOBULIN SER CALC-MCNC: 2.2 GM/DL
GLUCOSE SERPL-MCNC: 99 MG/DL (ref 65–99)
HBA1C MFR BLD: 5.2 % (ref 4.8–5.6)
HCT VFR BLD AUTO: 41.8 % (ref 34–46.6)
HGB BLD-MCNC: 14.3 G/DL (ref 12–15.9)
MCH RBC QN AUTO: 30.4 PG (ref 26.6–33)
MCHC RBC AUTO-ENTMCNC: 34.2 G/DL (ref 31.5–35.7)
MCV RBC AUTO: 88.9 FL (ref 79–97)
PLATELET # BLD AUTO: 267 10*3/MM3 (ref 140–450)
POTASSIUM SERPL-SCNC: 4.3 MMOL/L (ref 3.5–5.2)
PROT SERPL-MCNC: 6.7 G/DL (ref 6–8.5)
RBC # BLD AUTO: 4.7 10*6/MM3 (ref 3.77–5.28)
SODIUM SERPL-SCNC: 139 MMOL/L (ref 136–145)
T4 FREE SERPL-MCNC: 0.94 NG/DL (ref 0.93–1.7)
TSH SERPL DL<=0.005 MIU/L-ACNC: 1.52 UIU/ML (ref 0.27–4.2)
WBC # BLD AUTO: 8.77 10*3/MM3 (ref 3.4–10.8)

## 2023-05-23 ENCOUNTER — HOSPITAL ENCOUNTER (OUTPATIENT)
Dept: MAMMOGRAPHY | Facility: HOSPITAL | Age: 58
Discharge: HOME | End: 2023-05-23
Admitting: OBSTETRICS & GYNECOLOGY
Payer: COMMERCIAL

## 2023-05-23 DIAGNOSIS — Z12.39 ENCOUNTER FOR BREAST CANCER SCREENING USING NON-MAMMOGRAM MODALITY: ICD-10-CM

## 2023-05-23 PROCEDURE — 77067 SCR MAMMO BI INCL CAD: CPT

## 2023-05-23 PROCEDURE — 77063 BREAST TOMOSYNTHESIS BI: CPT

## 2023-06-15 ENCOUNTER — TELEPHONE (OUTPATIENT)
Dept: OBSTETRICS AND GYNECOLOGY | Facility: CLINIC | Age: 58
End: 2023-06-15

## 2023-06-15 NOTE — TELEPHONE ENCOUNTER
Pt states her [facility] pharmacy is filling her Duavee Rx. Pt asking if she can switch from her current estrogen patch and progesterone straight to the Duavee.     Per Shantal Mccarthy, APRN: Pt can discontinue estrogen patch and progesterone and start taking Duavee as soon as it is available. No need to adjust or taper dose.     Informed pt. Pt VU. Encouraged to call with any other questions.

## 2024-01-22 ENCOUNTER — OFFICE VISIT (OUTPATIENT)
Dept: FAMILY MEDICINE CLINIC | Facility: CLINIC | Age: 59
End: 2024-01-22
Payer: COMMERCIAL

## 2024-01-22 VITALS
TEMPERATURE: 97.8 F | OXYGEN SATURATION: 99 % | HEART RATE: 80 BPM | DIASTOLIC BLOOD PRESSURE: 68 MMHG | SYSTOLIC BLOOD PRESSURE: 104 MMHG | WEIGHT: 153 LBS | BODY MASS INDEX: 23.96 KG/M2

## 2024-01-22 DIAGNOSIS — Z13.820 ENCOUNTER FOR OSTEOPOROSIS SCREENING IN ASYMPTOMATIC POSTMENOPAUSAL PATIENT: ICD-10-CM

## 2024-01-22 DIAGNOSIS — G25.81 RESTLESS LEG SYNDROME: ICD-10-CM

## 2024-01-22 DIAGNOSIS — Z00.00 GENERAL MEDICAL EXAM: Primary | ICD-10-CM

## 2024-01-22 DIAGNOSIS — Z78.0 ENCOUNTER FOR OSTEOPOROSIS SCREENING IN ASYMPTOMATIC POSTMENOPAUSAL PATIENT: ICD-10-CM

## 2024-01-22 PROCEDURE — 93000 ELECTROCARDIOGRAM COMPLETE: CPT | Performed by: PHYSICIAN ASSISTANT

## 2024-01-22 PROCEDURE — 99396 PREV VISIT EST AGE 40-64: CPT | Performed by: PHYSICIAN ASSISTANT

## 2024-01-22 RX ORDER — METHOCARBAMOL 500 MG/1
500 TABLET, FILM COATED ORAL EVERY EVENING
Qty: 30 TABLET | Refills: 11 | Status: SHIPPED | OUTPATIENT
Start: 2024-01-22

## 2024-01-22 RX ORDER — VALACYCLOVIR HYDROCHLORIDE 1 G/1
1000 TABLET, FILM COATED ORAL 2 TIMES DAILY
Qty: 14 TABLET | Refills: 0 | Status: SHIPPED | OUTPATIENT
Start: 2024-01-22

## 2024-01-22 NOTE — PROGRESS NOTES
Subjective   Cadence Georges is a 58 y.o. female  Annual Exam (Annual physical, currently not fasting ) and Restless Legs Syndrome (Ongoing RLS, Robaxin did help, but has been out of medication)      History of Present Illness    Cadence Georges, 1965, presents today for an annual physical.    She has been feeling well. She saw Dr. Fernandez in 04/2023 and had laboratories performed. Her vitamin D level was elevated during that period. She was told to discontinue her vitamin D supplement She was taking over the counter vitamin D.     She is currently taking Duavee, which worked well for her in the past.     Her mammogram is up to date.     She has a family history of heart disease on her paternal side.     She denies eating a substantial number of processed foods; however, she snacks at night.     She gets her teeth and vision checked regularly. She just received new glasses recently.     She had the original shingles vaccine in 2015. She had a needlestick at work last week, 01/15/2024, and they checked her, and it came back that she needed a hepatitis B booster.     She went to the dermatologist in 12/2023 and had a mole check.     She has moderate hearing loss. She goes to Dr. Traylor' office to get hearing aids. She has an appointment on 01/26/2024.     Patient presents today for a preventive medical visit.  Patient is here to determine screening labs and tests that are due and to determine immunization status as well.  Patient will be counseled regarding preventative medicine issues such as regular exercise and healthy diet as well.  Patient is currently working part-time, 3 days a week at the [facility] [employer] and the [address] building here on the [facility].  The following portions of the patient's history were reviewed and updated as appropriate: allergies, current medications, past social history and problem list    Review of Systems   Constitutional: Negative.    HENT: Negative.  Positive for  hearing loss.    Eyes: Negative.    Respiratory: Negative.     Cardiovascular: Negative.    Gastrointestinal: Negative.    Endocrine: Negative.    Genitourinary: Negative.    Musculoskeletal: Negative.    Skin: Negative.    Allergic/Immunologic: Negative.    Neurological: Negative.  Positive for tremors (RLS at night).   Hematological: Negative.    Psychiatric/Behavioral: Negative.     All other systems reviewed and are negative.      Objective     Vitals:    01/22/24 1409   BP: 104/68   Pulse: 80   Temp: 97.8 °F (36.6 °C)   SpO2: 99%       Physical Exam  Vitals and nursing note reviewed.   Constitutional:       General: She is not in acute distress.     Appearance: Normal appearance. She is well-developed and normal weight. She is not ill-appearing, toxic-appearing or diaphoretic.      Comments: BMI23   HENT:      Head: Normocephalic and atraumatic.      Right Ear: External ear normal.      Left Ear: External ear normal.   Eyes:      Conjunctiva/sclera: Conjunctivae normal.      Pupils: Pupils are equal, round, and reactive to light.   Neck:      Thyroid: No thyromegaly.      Vascular: No carotid bruit or JVD.   Cardiovascular:      Rate and Rhythm: Normal rate and regular rhythm.      Pulses: Normal pulses.      Heart sounds: Normal heart sounds. No murmur heard.  Pulmonary:      Effort: Pulmonary effort is normal. No respiratory distress.      Breath sounds: Normal breath sounds.   Abdominal:      General: Bowel sounds are normal.      Palpations: Abdomen is soft. There is no mass.      Tenderness: There is no abdominal tenderness.   Musculoskeletal:         General: No swelling. Normal range of motion.      Cervical back: Normal range of motion and neck supple.   Lymphadenopathy:      Cervical: No cervical adenopathy.   Skin:     General: Skin is warm and dry.      Findings: No lesion or rash.   Neurological:      Mental Status: She is alert and oriented to person, place, and time.      Cranial Nerves: No  cranial nerve deficit.      Sensory: No sensory deficit.      Motor: No weakness.      Coordination: Coordination normal.      Gait: Gait normal.      Deep Tendon Reflexes: Reflexes are normal and symmetric.   Psychiatric:         Mood and Affect: Mood normal.         Behavior: Behavior normal.         Thought Content: Thought content normal.         Judgment: Judgment normal.       ECG 12 Lead    Date/Time: 1/22/2024 2:45 PM  Performed by: Shelli Estrada PA-C    Authorized by: Shelli Estrada PA-C  Comparison: not compared with previous ECG   Rhythm: sinus rhythm  Rate: normal  BPM: 69  Conduction: conduction normal  ST Segments: ST segments normal  T Waves: T waves normal  QRS axis: normal  Other: no other findings    Clinical impression: normal ECG          Discussed preventative medicine issues with patient including regular exercise, healthy diet, stress reduction, adequate sleep and recommended age-appropriate screening studies.  Assessment & Plan     Diagnoses and all orders for this visit:    1. General medical exam (Primary)  -     Comprehensive metabolic panel; Future  -     CBC (No Diff); Future  -     Magnesium; Future  -     TSH; Future  -     T4, Free; Future  -     Lipid Panel; Future  -     Iron Profile; Future    2. Encounter for osteoporosis screening in asymptomatic postmenopausal patient  -     DEXA Bone Density Axial; Future    3. Restless leg syndrome  -     Comprehensive metabolic panel; Future  -     CBC (No Diff); Future  -     Magnesium; Future  -     TSH; Future  -     T4, Free; Future  -     Iron Profile; Future    Other orders  -     valACYclovir (Valtrex) 1000 MG tablet; Take 1 tablet by mouth 2 (Two) Times a Day.  Dispense: 14 tablet; Refill: 0  -     methocarbamol (Robaxin) 500 MG tablet; Take 1 tablet by mouth Every Evening.  Dispense: 30 tablet; Refill: 11  -     ECG 12 Lead       1. Annual physical exam (Primary)  She is doing well overall. She is up to date on her  preventative care. She is due for a bone density test in 05/2024. She is due for a shingles vaccine. She is due for a full comprehensive metabolic panel, kidney functions, liver functions, magnesium, blood count, iron, cholesterol, thyroid.    2. Restless leg syndrome  She will continue her current medication regimen. Refill given.     Transcribed from ambient dictation for Shelli Estrada PA-C by Majo Wilkes.  01/22/24   15:09 EST    Patient or patient representative verbalized consent to the visit recording.  I have personally performed the services described in this document as transcribed by the above individual, and it is both accurate and complete.

## 2024-01-29 ENCOUNTER — LAB (OUTPATIENT)
Dept: LAB | Facility: HOSPITAL | Age: 59
End: 2024-01-29
Payer: COMMERCIAL

## 2024-01-29 DIAGNOSIS — Z00.00 GENERAL MEDICAL EXAM: ICD-10-CM

## 2024-01-29 DIAGNOSIS — G25.81 RESTLESS LEG SYNDROME: ICD-10-CM

## 2024-01-29 LAB
ALBUMIN SERPL-MCNC: 4.5 G/DL (ref 3.5–5.2)
ALBUMIN/GLOB SERPL: 2.1 G/DL
ALP SERPL-CCNC: 59 U/L (ref 39–117)
ALT SERPL W P-5'-P-CCNC: 14 U/L (ref 1–33)
ANION GAP SERPL CALCULATED.3IONS-SCNC: 8 MMOL/L (ref 5–15)
AST SERPL-CCNC: 16 U/L (ref 1–32)
BILIRUB SERPL-MCNC: 0.4 MG/DL (ref 0–1.2)
BUN SERPL-MCNC: 10 MG/DL (ref 6–20)
BUN/CREAT SERPL: 10.8 (ref 7–25)
CALCIUM SPEC-SCNC: 10.2 MG/DL (ref 8.6–10.5)
CHLORIDE SERPL-SCNC: 104 MMOL/L (ref 98–107)
CHOLEST SERPL-MCNC: 176 MG/DL (ref 0–200)
CO2 SERPL-SCNC: 29 MMOL/L (ref 22–29)
CREAT SERPL-MCNC: 0.93 MG/DL (ref 0.57–1)
DEPRECATED RDW RBC AUTO: 38 FL (ref 37–54)
EGFRCR SERPLBLD CKD-EPI 2021: 72.3 ML/MIN/1.73
ERYTHROCYTE [DISTWIDTH] IN BLOOD BY AUTOMATED COUNT: 11.8 % (ref 12.3–15.4)
GLOBULIN UR ELPH-MCNC: 2.1 GM/DL
GLUCOSE SERPL-MCNC: 89 MG/DL (ref 65–99)
HCT VFR BLD AUTO: 41.8 % (ref 34–46.6)
HDLC SERPL-MCNC: 59 MG/DL (ref 40–60)
HGB BLD-MCNC: 14 G/DL (ref 12–15.9)
IRON 24H UR-MRATE: 139 MCG/DL (ref 37–145)
IRON SATN MFR SERPL: 37 % (ref 20–50)
LDLC SERPL CALC-MCNC: 103 MG/DL (ref 0–100)
LDLC/HDLC SERPL: 1.74 {RATIO}
MAGNESIUM SERPL-MCNC: 1.8 MG/DL (ref 1.6–2.6)
MCH RBC QN AUTO: 30.1 PG (ref 26.6–33)
MCHC RBC AUTO-ENTMCNC: 33.5 G/DL (ref 31.5–35.7)
MCV RBC AUTO: 89.9 FL (ref 79–97)
PLATELET # BLD AUTO: 249 10*3/MM3 (ref 140–450)
PMV BLD AUTO: 10.1 FL (ref 6–12)
POTASSIUM SERPL-SCNC: 4.3 MMOL/L (ref 3.5–5.2)
PROT SERPL-MCNC: 6.6 G/DL (ref 6–8.5)
RBC # BLD AUTO: 4.65 10*6/MM3 (ref 3.77–5.28)
SODIUM SERPL-SCNC: 141 MMOL/L (ref 136–145)
T4 FREE SERPL-MCNC: 1.09 NG/DL (ref 0.93–1.7)
TIBC SERPL-MCNC: 373 MCG/DL (ref 298–536)
TRANSFERRIN SERPL-MCNC: 250 MG/DL (ref 200–360)
TRIGL SERPL-MCNC: 73 MG/DL (ref 0–150)
TSH SERPL DL<=0.05 MIU/L-ACNC: 1.78 UIU/ML (ref 0.27–4.2)
VLDLC SERPL-MCNC: 14 MG/DL (ref 5–40)
WBC NRBC COR # BLD AUTO: 6.11 10*3/MM3 (ref 3.4–10.8)

## 2024-01-29 PROCEDURE — 84466 ASSAY OF TRANSFERRIN: CPT

## 2024-01-29 PROCEDURE — 80061 LIPID PANEL: CPT

## 2024-01-29 PROCEDURE — 80050 GENERAL HEALTH PANEL: CPT

## 2024-01-29 PROCEDURE — 84439 ASSAY OF FREE THYROXINE: CPT

## 2024-01-29 PROCEDURE — 83540 ASSAY OF IRON: CPT

## 2024-01-29 PROCEDURE — 36415 COLL VENOUS BLD VENIPUNCTURE: CPT

## 2024-01-29 PROCEDURE — 83735 ASSAY OF MAGNESIUM: CPT

## 2024-03-28 ENCOUNTER — TELEPHONE (OUTPATIENT)
Dept: OBSTETRICS AND GYNECOLOGY | Facility: CLINIC | Age: 59
End: 2024-03-28
Payer: COMMERCIAL

## 2024-03-28 DIAGNOSIS — Z79.890 HORMONE REPLACEMENT THERAPY: Primary | ICD-10-CM

## 2024-03-28 RX ORDER — CONJUGATED ESTROGENS/BAZEDOXIFENE .45; 2 MG/1; MG/1
1 TABLET, FILM COATED ORAL DAILY
Qty: 30 TABLET | Refills: 2 | Status: SHIPPED | OUTPATIENT
Start: 2024-03-28

## 2024-03-28 NOTE — TELEPHONE ENCOUNTER
Mannie sent a fax that this pts Duavee PA is about to . Per Covermymeds:The member benefit does not include pharmacy drug coverage. Eligible drugs may be covered under the medical benefit. I called the pharm and they are sending me another PA on [facility]. [employer] sent another PA Key [id number] and [facility] says the same thing.The member benefit does not include pharmacy drug coverage. Eligible drugs may be covered under the medical benefit.

## 2024-03-28 NOTE — TELEPHONE ENCOUNTER
Pt called the office to confirm her insurance card is the same. She did just  $90 day supply for $75 with the discount card and maybe that is why the PA is not working. The pharmacy said just to wait until her next fill and to the PA then if needed.

## 2024-04-30 ENCOUNTER — OFFICE VISIT (OUTPATIENT)
Dept: OBSTETRICS AND GYNECOLOGY | Facility: CLINIC | Age: 59
End: 2024-04-30
Payer: COMMERCIAL

## 2024-04-30 VITALS
WEIGHT: 153 LBS | HEIGHT: 67 IN | SYSTOLIC BLOOD PRESSURE: 120 MMHG | DIASTOLIC BLOOD PRESSURE: 68 MMHG | BODY MASS INDEX: 24.01 KG/M2

## 2024-04-30 DIAGNOSIS — Z78.0 ASYMPTOMATIC MENOPAUSE: ICD-10-CM

## 2024-04-30 DIAGNOSIS — Z01.419 WELL WOMAN EXAM WITH ROUTINE GYNECOLOGICAL EXAM: Primary | ICD-10-CM

## 2024-04-30 DIAGNOSIS — Z12.31 SCREENING MAMMOGRAM FOR BREAST CANCER: ICD-10-CM

## 2024-04-30 DIAGNOSIS — Z79.890 HORMONE REPLACEMENT THERAPY: ICD-10-CM

## 2024-04-30 RX ORDER — CONJUGATED ESTROGENS/BAZEDOXIFENE .45; 2 MG/1; MG/1
1 TABLET, FILM COATED ORAL DAILY
Qty: 30 TABLET | Refills: 2 | Status: SHIPPED | OUTPATIENT
Start: 2024-04-30

## 2024-04-30 RX ORDER — SPIRONOLACTONE 100 MG/1
100 TABLET, FILM COATED ORAL DAILY
Qty: 90 TABLET | Refills: 3 | Status: SHIPPED | OUTPATIENT
Start: 2024-04-30 | End: 2025-04-30

## 2024-04-30 RX ORDER — CITALOPRAM 20 MG/1
30 TABLET ORAL DAILY
Qty: 135 TABLET | Refills: 3 | Status: SHIPPED | OUTPATIENT
Start: 2024-04-30 | End: 2025-04-30

## 2024-04-30 NOTE — PROGRESS NOTES
Gynecologic Annual Exam Note        GYN Annual Exam     CC - Here for annual exam.        HPI  Cadence Georges is a 58 y.o. female, , who presents for annual well woman exam as an established patient.  She is postmenopausal. Denies vaginal bleeding.   There were no changes to her medical or surgical history since her last visit. Marital Status: .  She is sexually active. She has not had new partners. STD testing recommendations have been explained to the patient and she declines STD testing.    The patient would like to discuss the following complaints today: no issues    Additional OB/GYN History   On HRT? Yes. Details: duavee    Last Pap : 2021. Results: negative. HPV: negative.   Last Completed Pap Smear       This patient has no relevant Health Maintenance data.          History of abnormal Pap smear: yes -  LEEP follow ups normal  Family history of uterine, colon, breast, or ovarian cancer: yes [place] [facility] [place]. Mat aunt with breast and ovarian cancer  Performs monthly Self-Breast Exam: sometimes  Last mammogram: 2023. Done at . There is a copy in the chart.    Last Completed Mammogram       This patient has no relevant Health Maintenance data.          Last colonoscopy: has had a colonoscopy 5 yrs ago    Last Completed Colonoscopy            COLORECTAL CANCER SCREENING (COLONOSCOPY - Every 10 Years) Next due on 2018  COLONOSCOPY (Done)                    She has never had a bone density scan  Exercises Regularly: yes  Feelings of Anxiety or Depression: no      Tobacco Usage?: No       Current Outpatient Medications:     citalopram (CeleXA) 20 MG tablet, Take 1.5 tablets by mouth Daily., Disp: 135 tablet, Rfl: 3    Conj Estrogens-Bazedoxifene (Duavee) 0.45-20 MG tablet, Take 1 tablet by mouth Daily., Disp: 30 tablet, Rfl: 2    methocarbamol (Robaxin) 500 MG tablet, Take 1 tablet by mouth Every Evening., Disp: 30 tablet, Rfl: 11     spironolactone (ALDACTONE) 100 MG tablet, Take 1 tablet by mouth Daily., Disp: 90 tablet, Rfl: 3    valACYclovir (Valtrex) 1000 MG tablet, Take 1 tablet by mouth 2 (Two) Times a Day., Disp: 14 tablet, Rfl: 0    Patient is requesting refills of celexa, duavee, spironolactone .    OB History          1    Para   1    Term   1       0    AB   0    Living   1         SAB   0    IAB   0    Ectopic   0    Molar   0    Multiple   0    Live Births   1                Past Medical History:   Diagnosis Date    Anxiety disorder     History of abnormal cervical Papanicolaou smear     History of cold sores         Past Surgical History:   Procedure Laterality Date    CERVICAL BIOPSY  W/ LOOP ELECTRODE EXCISION  ?    COLPOSCOPY W/ BIOPSY / CURETTAGE  2005    TREV II, negative ECC    ENDOMETRIAL ABLATION  2008    Dr. Kothari's Office    EYE SURGERY      radiokeratotomy    LEEP  2005    squamous metaplasia, neg margins    SEPTOPLASTY         Health Maintenance   Topic Date Due    DXA SCAN  Never done    TDAP/TD VACCINES (1 - Tdap) Never done    ZOSTER VACCINE (2 of 2) 2017    COVID-19 Vaccine (3 - - season) 2023    PAP SMEAR  2024    Annual Gynecologic Pelvic and Breast Exam  2024    MAMMOGRAM  2024    INFLUENZA VACCINE  2024    ANNUAL PHYSICAL  2025    COLORECTAL CANCER SCREENING  2028    HEPATITIS C SCREENING  Completed    Pneumococcal Vaccine 0-64  Aged Out       The additional following portions of the patient's history were reviewed and updated as appropriate: allergies, current medications, past family history, past medical history, past social history, past surgical history, and problem list.    Review of Systems   Constitutional: Negative.    HENT: Negative.     Eyes: Negative.    Respiratory: Negative.     Cardiovascular: Negative.    Gastrointestinal: Negative.    Endocrine: Negative.    Genitourinary: Negative.    Musculoskeletal: Negative.   "  Skin: Negative.    Allergic/Immunologic: Negative.    Neurological: Negative.    Hematological: Negative.    Psychiatric/Behavioral: Negative.         I have reviewed and agree with the HPI, ROS, and historical information as entered above. Beatriz CummingsASHLEE      Objective   /68   Ht 170.2 cm (67\")   Wt 69.4 kg (153 lb)   BMI 23.96 kg/m²     Physical Exam  Vitals and nursing note reviewed. Exam conducted with a chaperone present.   Constitutional:       General: She is not in acute distress.     Appearance: Normal appearance. She is well-developed and normal weight. She is not ill-appearing.   Neck:      Thyroid: No thyroid mass or thyromegaly.   Pulmonary:      Effort: Pulmonary effort is normal. No respiratory distress or retractions.   Chest:      Chest wall: No mass.   Breasts:     Right: Normal. No mass, nipple discharge, skin change or tenderness.      Left: Normal. No mass, nipple discharge, skin change or tenderness.   Abdominal:      General: There is no distension.      Palpations: Abdomen is soft. Abdomen is not rigid. There is no mass.      Tenderness: There is no abdominal tenderness. There is no guarding or rebound.      Hernia: No hernia is present.   Genitourinary:     General: Normal vulva.      Exam position: Lithotomy position.      Labia:         Right: No rash, tenderness or lesion.         Left: No rash, tenderness or lesion.       Vagina: Normal. No vaginal discharge or lesions.      Cervix: Normal. No cervical motion tenderness or erythema.      Uterus: Normal. Not enlarged, not fixed and not tender.       Adnexa: Right adnexa normal and left adnexa normal.        Right: No mass or tenderness.          Left: No mass or tenderness.        Rectum: Normal. No external hemorrhoid.   Musculoskeletal:      Cervical back: No muscular tenderness.   Skin:     General: Skin is warm and dry.   Neurological:      Mental Status: She is alert and oriented to person, place, and time. "   Psychiatric:         Mood and Affect: Mood normal.         Behavior: Behavior normal.            Assessment and Plan    Problem List Items Addressed This Visit    None  Visit Diagnoses       Well woman exam with routine gynecological exam    -  Primary    Relevant Orders    LIQUID-BASED PAP SMEAR WITH HPV GENOTYPING REGARDLESS OF INTERPRETATION ([facility]EX[facility]DRU,[facility]AD)    Hormone replacement therapy        Relevant Medications    citalopram (CeleXA) 20 MG tablet    spironolactone (ALDACTONE) 100 MG tablet    Conj Estrogens-Bazedoxifene (Duavee) 0.45-20 MG tablet    Asymptomatic menopause        Relevant Orders    DEXA Bone Density Axial    Screening mammogram for breast cancer        Relevant Orders    Mammo Screening Digital Tomosynthesis Bilateral With CAD            GYN annual well woman exam.   Reviewed monthly self breast exams.  Instructed to call with lumps, pain, or breast discharge.  Yearly mammograms ordered.  Recommended use of Vitamin D and getting adequate calcium in her diet. (1500mg)  Osteoporosis screening ordered today.  Reviewed risks of ERT including increased risk of breast cancer, increased blood clots, increased heart disease.  Patient strongly desires to stay on or start ERT.  She understands we will use the lowest amount that adequately controls her symptoms.  RTC in 1 year or PRN with problems.  Pt would like to have DEXA done today if possible- spoke to ultrasound and they can work her in. Order placed.   Return in about 1 year (around 4/30/2025) for Annual physical.         Beatriz Cummings, ASHLEE  04/30/2024

## 2024-05-03 LAB — REF LAB TEST METHOD: NORMAL

## 2024-06-11 ENCOUNTER — HOSPITAL ENCOUNTER (OUTPATIENT)
Dept: MAMMOGRAPHY | Facility: HOSPITAL | Age: 59
Discharge: HOME | End: 2024-06-11
Admitting: ADVANCED PRACTICE MIDWIFE
Payer: COMMERCIAL

## 2024-06-11 DIAGNOSIS — Z12.31 SCREENING MAMMOGRAM FOR BREAST CANCER: ICD-10-CM

## 2024-06-11 PROCEDURE — 77063 BREAST TOMOSYNTHESIS BI: CPT

## 2024-06-11 PROCEDURE — 77067 SCR MAMMO BI INCL CAD: CPT

## 2024-06-17 ENCOUNTER — TELEPHONE (OUTPATIENT)
Dept: OBSTETRICS AND GYNECOLOGY | Facility: CLINIC | Age: 59
End: 2024-06-17
Payer: COMMERCIAL

## 2024-06-17 NOTE — TELEPHONE ENCOUNTER
3/2024 [employer] faxed a note about Duavee Expiring PA.     At the time it said the pt did not have pharmacy benefits but the previous PA was still good through 4/23/24 and she was able to  a 90 day rx. I called the pharmacy help desk and they transferred me to the PA dept with Romana and they said the pt had two profiles and she was able to do the PA over the phone. Is has gone to review and I will fax office notes to support the renewal. [phone] ID#[id number] fax# [phone]

## 2024-06-19 ENCOUNTER — TELEPHONE (OUTPATIENT)
Dept: OBSTETRICS AND GYNECOLOGY | Facility: CLINIC | Age: 59
End: 2024-06-19
Payer: COMMERCIAL

## 2024-06-19 NOTE — TELEPHONE ENCOUNTER
The PA renewal for Duavee was denied. It should be covered with the correct diagnosis of moderate to severe vasomotor symptoms associated with menopause. I did the PA over the phone and will call back to correct any mistakes with Anne. This time the PA was approved for 1 year. Ref#[id number]. [employer] notified. $75 for 90 day supply. Left message for the patient.

## 2024-07-29 ENCOUNTER — OFFICE VISIT (OUTPATIENT)
Dept: ORTHOPEDIC SURGERY | Facility: CLINIC | Age: 59
End: 2024-07-29
Payer: COMMERCIAL

## 2024-07-29 ENCOUNTER — TREATMENT (OUTPATIENT)
Dept: PHYSICAL THERAPY | Facility: CLINIC | Age: 59
End: 2024-07-29
Payer: COMMERCIAL

## 2024-07-29 VITALS
HEIGHT: 66 IN | WEIGHT: 149 LBS | SYSTOLIC BLOOD PRESSURE: 122 MMHG | BODY MASS INDEX: 23.95 KG/M2 | DIASTOLIC BLOOD PRESSURE: 74 MMHG

## 2024-07-29 DIAGNOSIS — M70.61 TROCHANTERIC BURSITIS OF RIGHT HIP: Primary | ICD-10-CM

## 2024-07-29 DIAGNOSIS — M16.11 PRIMARY OSTEOARTHRITIS OF RIGHT HIP: ICD-10-CM

## 2024-07-29 DIAGNOSIS — M25.551 RIGHT HIP PAIN: Primary | ICD-10-CM

## 2024-07-29 PROCEDURE — 99213 OFFICE O/P EST LOW 20 MIN: CPT | Performed by: ORTHOPAEDIC SURGERY

## 2024-07-29 NOTE — PROGRESS NOTES
Physical Therapy Initial Evaluation and Plan of Care  [address], [address], [place] [id number]    Patient: Cadence Georges   : 1965  Diagnosis/ICD-10 Code:  Right hip pain [M25.551]  Referring practitioner: Everett Ortega MD  Date of Initial Visit: 2024  Today's Date: 2024  Patient seen for 1 session         Visit Diagnoses:    ICD-10-CM ICD-9-CM   1. Right hip pain  M25.551 719.45         Subjective Questionnaire: LEFS: 64/80      Subjective Evaluation    History of Present Illness  Date of onset: 2020  Mechanism of injury: Patient notes 4 years ago she began to have right lateral hip pain that has been on and off.  First noticed when she was going up the stairs, primarily.  Had some minor anterior pain upon starting initially.  Notes pain is worse with immobility, notes some trouble getting out of a kayak.  Notes some worsening the last several months, primarily lateral hip pain.  Laying on the left side for sleep for prolonged periods.  Notes no significant pain in right sidelying.       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 Mild posterior hip tenderness    Neurological Testing     Sensation     Hip   Left Hip   Intact: light touch    Right Hip   Intact: light touch    Strength/Myotome Testing     Left Hip   Planes of Motion   Flexion: 4+  Extension: 4  Abduction: 4+    Right Hip   Planes of Motion    Flexion: 4+  Extension: 4  Abduction: 3    Tests     Right Hip   Positive BRYCE and Vahid.   Negative scour.     Ambulation     Comments   Unremarkable gait          Assessment & Plan       Assessment  Impairments: abnormal gait, abnormal or restricted ROM, activity intolerance, impaired physical strength, lacks appropriate home exercise program, pain with function and weight-bearing intolerance   Functional limitations: carrying objects, walking, pulling, pushing, uncomfortable because of pain, moving in bed, sitting, standing, stooping and unable to perform repetitive tasks   Assessment details: Patient presents with acute on chronic right hip most consistent with mechanical soft tissue irritation.  She is weak in right hip abduction and mildly limited in OBERs position. Patient should respond well to an appropriate program to address deficits present.   Prognosis: good    Goals  Plan Goals: Short Term Goals (3 weeks)  1.  Patient to be compliant with initial HEP  2.  Patient to report improved tolerance to prolonged standing/walking    Long Term Goals (6 weeks)  1. Patient to be independent with final HEP  2. Patient to demonstrate minimal tenderness along the lateral hip/gluteal region  3. Patient to ascend and descend one flight of stairs with one handrail with appropriate safety and minimal deficit  4. Patient to transfer from her kayak without increasing pain  5. Patient to improve LEFS score to at least 70/80      Plan  Therapy options: will be seen for skilled therapy services  Planned modality interventions: electrical stimulation/Russian stimulation, TENS, ultrasound and traction  Planned therapy interventions: manual therapy, therapeutic activities, postural training, body mechanics training, functional ROM exercises, flexibility, gait training, stretching, strengthening, joint mobilization, neuromuscular re-education, soft tissue mobilization and spinal/joint mobilization  Frequency: 1x week  Duration  in visits: 6  Treatment plan discussed with: patient        History # of Personal Factors and/or Comorbidities: MODERATE (1-2)  Examination of Body System(s): # of elements: MODERATE (3)  Clinical Presentation: EVOLVING  Clinical Decision Making: MODERATE      Timed:         Manual Therapy:    0     mins  34718;     Therapeutic Exercise:    10     mins  58828;     Neuromuscular Stevie:    0    mins  86744;    Therapeutic Activity:     0     mins  44160;     Gait Trainin     mins  79330;     Ultrasound:     0     mins  27526;    Ionto                               0    mins   43295  Self Care                       0     mins   66128  Canalith Repos    0     mins 51851      Un-Timed:  Electrical Stimulation:    0     mins  72900 ( );  Dry Needling     0     mins self-pay  Traction     0     mins 79272  Low Eval     0     Mins  04427  Mod Eval     24     Mins  46499  High Eval                       0     Mins  26033        Timed Treatment:   10   mins   Total Treatment:     34   mins          PT: Tre Campbell, PT     License Number: [id number]    Electronically signed by Tre Campbell PT, 24, 4:44 PM EDT    Certification Period: 2024 thru 10/26/2024  I certify that the therapy services are furnished while this patient is under my care.  The services outlined above are required by this patient, and will be reviewed every 90 days.         Physician Signature:__________________________________________________    PHYSICIAN: Everett Ortega MD  NPI: [id number]      DATE:     Please sign and return via fax to [email or web address] . Thank you, [facility].

## 2024-07-29 NOTE — PROGRESS NOTES
[facility] Note    Subjective     Chief Complaint   Patient presents with    Right Hip - Pain        HPI    Cadence Georges is a 59 y.o. female who presents with new problem of: right hip pain.  Onset: atraumatic and gradual in nature. The issue has been ongoing for 3 year(s). Pain is a 4-9/10 on the pain scale depending on the time of day and certain movements. Pain is described as aching and throbbing. Associated symptoms include pain and stiffness. The pain is worse with standing and climbing stairs; resting improve the pain. Previous treatments have included: NSAIDS and physical therapy.  Pain is primarily on the lateral aspect of the hip.  No groin pain.    I have reviewed the following portions of the patient's history and agree with: History of Present Illness and Review of Systems    There is no problem list on file for this patient.    Past Medical History:   Diagnosis Date    Anxiety disorder     History of abnormal cervical Papanicolaou smear     History of cold sores       Past Surgical History:   Procedure Laterality Date    CERVICAL BIOPSY  W/ LOOP ELECTRODE EXCISION  ?    COLPOSCOPY W/ BIOPSY / CURETTAGE  2005    TREV II, negative ECC    ENDOMETRIAL ABLATION  2008    Dr. [facility]    EYE SURGERY      radiokeratotomy    LEEP  2005    squamous metaplasia, neg margins    SEPTOPLASTY        Family History   Problem Relation Age of Onset    Heart attack Father     Heart disease Father     COPD Mother     Uterine cancer Maternal Grandmother     Breast cancer Maternal Aunt 50        SAME AUNT    Ovarian cancer Maternal Aunt 60        SAME AUNT    Diabetes Maternal Aunt     Diabetes Paternal Uncle     Colon cancer Neg Hx      Social History     Socioeconomic History    Marital status:    Tobacco Use    Smoking status: Former     Current packs/day: 0.00     Types: Cigarettes     Quit date: 1998     Years since quittin.5    Smokeless tobacco:  Never   Vaping Use    Vaping status: Never Used   Substance and Sexual Activity    Alcohol use: Not Currently    Drug use: Never    Sexual activity: Yes     Partners: Male     Birth control/protection: Other, Post-menopausal, Vasectomy      Current Outpatient Medications on File Prior to Visit   Medication Sig Dispense Refill    citalopram (CeleXA) 20 MG tablet Take 1.5 tablets by mouth Daily. 135 tablet 3    Conj Estrogens-Bazedoxifene (Duavee) 0.45-20 MG tablet Take 1 tablet by mouth Daily. 30 tablet 2    methocarbamol (Robaxin) 500 MG tablet Take 1 tablet by mouth Every Evening. 30 tablet 11    spironolactone (ALDACTONE) 100 MG tablet Take 1 tablet by mouth Daily. 90 tablet 3    valACYclovir (Valtrex) 1000 MG tablet Take 1 tablet by mouth 2 (Two) Times a Day. 14 tablet 0     No current facility-administered medications on file prior to visit.      No Known Allergies     Review of Systems   Constitutional:  Negative for activity change, appetite change, chills, diaphoresis, fatigue, fever and unexpected weight change.   HENT:  Negative for congestion, dental problem, drooling, ear discharge, ear pain, facial swelling, hearing loss, mouth sores, nosebleeds, postnasal drip, rhinorrhea, sinus pressure, sneezing, sore throat, tinnitus, trouble swallowing and voice change.    Eyes:  Negative for photophobia, pain, discharge, redness, itching and visual disturbance.   Respiratory:  Negative for apnea, cough, choking, chest tightness, shortness of breath, wheezing and stridor.    Cardiovascular:  Negative for chest pain, palpitations and leg swelling.   Gastrointestinal:  Negative for abdominal distention, abdominal pain, anal bleeding, blood in stool, constipation, diarrhea, nausea, rectal pain and vomiting.   Endocrine: Negative for cold intolerance, heat intolerance, polydipsia, polyphagia and polyuria.   Genitourinary:  Negative for decreased urine volume, difficulty urinating, dysuria, enuresis, flank pain,  "frequency, genital sores, hematuria and urgency.   Musculoskeletal:  Positive for arthralgias. Negative for back pain, gait problem, joint swelling, myalgias, neck pain and neck stiffness.   Skin:  Negative for color change, pallor, rash and wound.   Allergic/Immunologic: Negative for environmental allergies, food allergies and immunocompromised state.   Neurological:  Negative for dizziness, tremors, seizures, syncope, facial asymmetry, speech difficulty, weakness, light-headedness, numbness and headaches.   Hematological:  Negative for adenopathy. Does not bruise/bleed easily.   Psychiatric/Behavioral:  Negative for agitation, behavioral problems, confusion, decreased concentration, dysphoric mood, hallucinations, self-injury, sleep disturbance and suicidal ideas. The patient is not nervous/anxious and is not hyperactive.         Objective      Physical Exam  /74   Ht 167.6 cm (66\")   Wt 67.6 kg (149 lb)   BMI 24.05 kg/m²     Body mass index is 24.05 kg/m².  BMI is within normal parameters. No other follow-up for BMI required.        General:   Mental Status:  Alert   Appearance: Cooperative, in no acute distress   Build and Nutrition: Well-nourished well-developed female   Orientation: Alert and oriented to person, place and time   Posture: Normal   Gait: Nonantalgic/normal    Lower Extremities:   Right Hip:    Tenderness:  Lateral tenderness    Swelling: None    Crepitus:  None    Atrophy:  None    Range of motion:  External Rotation: 40°       Internal Rotation: 40°, no pain       Flexion:  100°       Extension:  0°   Instability:  None  Deformities:  None  Functional testing: Negative Stinchfield    No leg length discrepancy      Imaging/Studies      Imaging Results (Last 24 Hours)       Procedure Component Value Units Date/Time    XR Hip With or Without Pelvis 2 - 3 View Right [[id number]] Resulted: 07/29/24 1505     Updated: 07/29/24 1506    Narrative:      Right Hip Radiographs  Indication: right " hip pain  Views: low AP pelvis and lateral of the right hip    Comparison: 7/22/2020    Findings:   No acute bony abnormalities.  Small inferior acetabular osteophyte, with   bone-on-bone contact medially, with no other unusual bony features.  No   change compared to the previous imaging.              Assessment and Plan     Diagnoses and all orders for this visit:    1. Trochanteric bursitis of right hip (Primary)  -     Ambulatory Referral to Physical Therapy for Evaluation & Treatment    2. Primary osteoarthritis of right hip  -     XR Hip With or Without Pelvis 2 - 3 View Right        1. Trochanteric bursitis of right hip    2. Primary osteoarthritis of right hip        I reviewed my findings with the patient.  Although she does have mild radiographic signs of right hip arthritis, I do not believe that is her pain generator.  She is bothered with trochanteric bursitis and likely tendinitis, and at this point I recommended a trial of physical therapy and a referral was provided.  I will see her back in 6 weeks to ensure improvement, but I will be happy to see her back sooner for any problems.  Further imaging with MRI may be considered in the future if she has persistent symptoms.  Injection may be considered in the future if appropriate.    Return in about 6 weeks (around 9/9/2024).      Everett Ortega MD  07/29/24  15:24 EDT    Dictated Utilizing Dragon Dictation

## 2024-08-06 ENCOUNTER — TREATMENT (OUTPATIENT)
Dept: PHYSICAL THERAPY | Facility: CLINIC | Age: 59
End: 2024-08-06
Payer: COMMERCIAL

## 2024-08-06 DIAGNOSIS — M25.551 RIGHT HIP PAIN: Primary | ICD-10-CM

## 2024-08-07 NOTE — PROGRESS NOTES
Physical Therapy Daily Treatment Note  [address], [address], [place] [address]      Patient: Cadence Georges   : 1965  Referring practitioner: Everett Ortega MD  Date of Initial Visit: Type: THERAPY  Noted: 2024  Today's Date: 2024  Patient seen for 2 sessions       Visit Diagnoses:    ICD-10-CM ICD-9-CM   1. Right hip pain  M25.551 719.45       Subjective   Patient reports hip still hurts, but it may be some better.  She was able to kayak a couple times and didn't have any increase in irritability.  her pain level is 1-2/10 upon arrival.      Objective   See Exercise, Manual, and Modality Logs for complete treatment.       Assessment & Plan       Assessment  Assessment details: Patient notes generalized improvement but was challenged with qped exercises. We will continue to address current deficits and progress as tolerated.           Timed:         Manual Therapy:    10     mins  49382;     Therapeutic Exercise:    15     mins  58384;     Neuromuscular Stevie:    15    mins  10157;    Therapeutic Activity:     0     mins  97150;     Gait Trainin     mins  05404;     Ultrasound:     0     mins  42458;    Ionto                               0    mins   72992  Self Care                       0     mins   63919  Canalith Repos    0     mins 86397      Un-Timed:  Electrical Stimulation:    0     mins  37443 ( );  Dry Needling     0     mins self-pay  Traction     0     mins 16707      Timed Treatment:   40   mins   Total Treatment:     40   mins    Tre Campbell, PT  [place] License: [id number]

## 2024-08-27 ENCOUNTER — TREATMENT (OUTPATIENT)
Dept: PHYSICAL THERAPY | Facility: CLINIC | Age: 59
End: 2024-08-27
Payer: COMMERCIAL

## 2024-08-27 DIAGNOSIS — M25.551 RIGHT HIP PAIN: Primary | ICD-10-CM

## 2024-08-27 NOTE — PROGRESS NOTES
Physical Therapy Daily Treatment Note  [address], [address], [place] [address]      Patient: Cadence Georges   : 1965  Referring practitioner: Everett Ortega MD  Date of Initial Visit: Type: THERAPY  Noted: 2024  Today's Date: 2024  Patient seen for 3 sessions       Visit Diagnoses:    ICD-10-CM ICD-9-CM   1. Right hip pain  M25.551 719.45       Subjective   Patient reports that she continues to do well.  Had to travel to [place] to help care for her mother.  She is doing quite a bit better but still has some pain.  Had some pain with prolonged sitting while in [place].  her pain level is 0/10 upon arrival.      Objective   See Exercise, Manual, and Modality Logs for complete treatment.       Assessment & Plan       Assessment  Assessment details: Patient is progressing well, overall.  Pain has been minimal with PT and her daily pain is improved.  Plan is to increase her load with standing exercises and begin discussing d/c planning.           Timed:         Manual Therapy:    0     mins  28551;     Therapeutic Exercise:    25     mins  80952;     Neuromuscular Stevie:    15    mins  76298;    Therapeutic Activity:     0     mins  00072;     Gait Trainin     mins  65836;     Ultrasound:     0     mins  37182;    Ionto                               0    mins   97294  Self Care                       0     mins   94190  Canalith Repos    0     mins 93528      Un-Timed:  Electrical Stimulation:    0     mins  24906 ( );  Dry Needling     0     mins self-pay  Traction     0     mins 16201      Timed Treatment:   40   mins   Total Treatment:     40   mins    Tre Campbell, PT  [place] License: [id number]

## 2024-09-10 ENCOUNTER — TREATMENT (OUTPATIENT)
Dept: PHYSICAL THERAPY | Facility: CLINIC | Age: 59
End: 2024-09-10
Payer: COMMERCIAL

## 2024-09-10 DIAGNOSIS — M25.551 RIGHT HIP PAIN: Primary | ICD-10-CM

## 2024-09-10 PROCEDURE — 97112 NEUROMUSCULAR REEDUCATION: CPT | Performed by: PHYSICAL THERAPIST

## 2024-09-10 PROCEDURE — 97110 THERAPEUTIC EXERCISES: CPT | Performed by: PHYSICAL THERAPIST

## 2024-09-10 NOTE — PROGRESS NOTES
Physical Therapy Daily Treatment Note  [address], [address], [facility] [address]      Patient: Cadence Georges   : 1965  Referring practitioner: Everett Ortega MD  Date of Initial Visit: Type: THERAPY  Noted: 2024  Today's Date: 9/10/2024  Patient seen for 4 sessions       Visit Diagnoses:    ICD-10-CM ICD-9-CM   1. Right hip pain  M25.551 719.45       Subjective   Patient reports that she had COVID last week and decreased her activity level.  Despite this, she still tried to do her exercises.  She feels her hip hurts less at rest and she is having less irritability, overall.  her pain level is 5/10 upon arrival.      Objective   See Exercise, Manual, and Modality Logs for complete treatment.       Assessment & Plan       Assessment  Assessment details: Patient is progressing well but it is difficult to ascertain improvements due to decreased activity level.  We progressed her program today and will monitor for progression of symptoms over the next two weeks.  Could consider d/c at next session if she increases her activities and her pain remains low to diminished.           Timed:         Manual Therapy:    0     mins  85860;     Therapeutic Exercise:    24     mins  62134;     Neuromuscular Stevie:    10    mins  36664;    Therapeutic Activity:     0     mins  32742;     Gait Trainin     mins  54866;     Ultrasound:     0     mins  12673;    Ionto                               0    mins   25409  Self Care                       0     mins   83111  Canalith Repos    0     mins 77077      Un-Timed:  Electrical Stimulation:    0     mins  73541 ( );  Dry Needling     0     mins self-pay  Traction     0     mins 99713      Timed Treatment:   34   mins   Total Treatment:     34   mins    Tre Campbell, PT  [place] License: [id number]

## 2024-09-12 DIAGNOSIS — Z79.890 HORMONE REPLACEMENT THERAPY: ICD-10-CM

## 2024-09-12 RX ORDER — CONJUGATED ESTROGENS/BAZEDOXIFENE .45; 2 MG/1; MG/1
1 TABLET, FILM COATED ORAL DAILY
Qty: 30 TABLET | Refills: 7 | Status: SHIPPED | OUTPATIENT
Start: 2024-09-12

## 2024-09-24 ENCOUNTER — TREATMENT (OUTPATIENT)
Dept: PHYSICAL THERAPY | Facility: CLINIC | Age: 59
End: 2024-09-24
Payer: COMMERCIAL

## 2024-09-24 DIAGNOSIS — M25.551 RIGHT HIP PAIN: Primary | ICD-10-CM

## 2025-02-11 RX ORDER — METHOCARBAMOL 500 MG/1
500 TABLET, FILM COATED ORAL EVERY EVENING
Qty: 30 TABLET | Refills: 0 | OUTPATIENT
Start: 2025-02-11

## 2025-02-27 ENCOUNTER — OFFICE VISIT (OUTPATIENT)
Dept: FAMILY MEDICINE CLINIC | Facility: CLINIC | Age: 60
End: 2025-02-27
Payer: COMMERCIAL

## 2025-02-27 VITALS
DIASTOLIC BLOOD PRESSURE: 64 MMHG | WEIGHT: 150 LBS | HEART RATE: 64 BPM | HEIGHT: 66 IN | OXYGEN SATURATION: 95 % | TEMPERATURE: 97.8 F | SYSTOLIC BLOOD PRESSURE: 118 MMHG | BODY MASS INDEX: 24.11 KG/M2

## 2025-02-27 DIAGNOSIS — G25.81 RESTLESS LEG SYNDROME: ICD-10-CM

## 2025-02-27 DIAGNOSIS — Z00.00 GENERAL MEDICAL EXAM: Primary | ICD-10-CM

## 2025-02-27 PROCEDURE — 99396 PREV VISIT EST AGE 40-64: CPT | Performed by: PHYSICIAN ASSISTANT

## 2025-02-27 RX ORDER — METHOCARBAMOL 500 MG/1
500 TABLET, FILM COATED ORAL EVERY EVENING
Qty: 30 TABLET | Refills: 11 | Status: SHIPPED | OUTPATIENT
Start: 2025-02-27

## 2025-02-27 NOTE — PROGRESS NOTES
Subjective   Cadence Georges is a 59 y.o. female  Annual Exam (Annual physical and labs, not fasting/) and Restless Legs Syndrome (Refill on robaxin )      History of Present Illness  History of Present Illness   patient presents today for a preventive medical visit.  Patient is here to determine screening labs and tests that are due and to determine immunization status as well.  Patient will be counseled regarding preventative medicine issues such as regular exercise and healthy diet as well.      The patient is a 59-year-old female presenting today for her annual physical.    She reports overall good health, with regular mammograms conducted by her gynecologist, the most recent of which was last summer. She has an upcoming appointment with her gynecologist in April. A colonoscopy performed in 2018 yielded normal results, negating the need for a repeat procedure until 2028. She undergoes biennial eye examinations. She reports no skin abnormalities, including unusual moles or changing spots, and has a diagnosis of keratosis. A mole check was conducted last year, and another was scheduled for January, but she was unable to attend due to time constraints. She reports no pruritus or bleeding from any skin lesions and has no history of abnormal skin cells or cancers. She has consumed food prior to this visit. She took vitamin D supplements during the winter months due to limited outdoor exposure. She reports satisfactory sleep quality and stable energy levels compared to the previous year. She has not contracted influenza this year. She recently acquired hearing aids and regularly checks her ears for cerumen impaction. She reports normal bowel movements and urination, with no issues of incontinence. She does not feel the need for medication for overactive bladder and ensures adequate hydration. She uses compression socks regularly.    She takes methocarbamol as needed for leg cramps, typically on workdays, and  finds it effective. She has sufficient supply of Valtrex at home and does not require a refill at this time.    Supplemental Information  She has a history of L4-5 issues and has undergone physical therapy for hip pain.    MEDICATIONS  Current: methocarbamol, Valtrex    The following portions of the patient's history were reviewed and updated as appropriate: allergies, current medications, past social history and problem list    Review of Systems   Constitutional: Negative.    HENT: Negative.     Eyes: Negative.    Respiratory: Negative.     Cardiovascular: Negative.    Gastrointestinal: Negative.    Endocrine: Negative.    Genitourinary: Negative.    Musculoskeletal: Negative.    Skin: Negative.    Allergic/Immunologic: Negative.    Neurological:  Positive for tremors (at night).   Hematological: Negative.    Psychiatric/Behavioral: Negative.     All other systems reviewed and are negative.      Objective     Vitals:    02/27/25 1531   BP: 118/64   Pulse: 64   Temp: 97.8 °F (36.6 °C)   SpO2: 95%       Physical Exam  Vitals and nursing note reviewed.   Constitutional:       General: She is not in acute distress.     Appearance: Normal appearance. She is well-developed. She is not ill-appearing, toxic-appearing or diaphoretic.   HENT:      Head: Normocephalic and atraumatic.      Right Ear: External ear normal.      Left Ear: External ear normal.   Eyes:      Conjunctiva/sclera: Conjunctivae normal.      Pupils: Pupils are equal, round, and reactive to light.   Neck:      Thyroid: No thyromegaly.      Vascular: No carotid bruit or JVD.   Cardiovascular:      Rate and Rhythm: Normal rate and regular rhythm.      Pulses: Normal pulses.      Heart sounds: Normal heart sounds. No murmur heard.  Pulmonary:      Effort: Pulmonary effort is normal. No respiratory distress.      Breath sounds: Normal breath sounds.   Abdominal:      General: Bowel sounds are normal.      Palpations: Abdomen is soft. There is no mass.       Tenderness: There is no abdominal tenderness.   Musculoskeletal:         General: No swelling. Normal range of motion.      Cervical back: Normal range of motion and neck supple.   Lymphadenopathy:      Cervical: No cervical adenopathy.   Skin:     General: Skin is warm and dry.      Findings: No lesion or rash.   Neurological:      Mental Status: She is alert and oriented to person, place, and time.      Cranial Nerves: No cranial nerve deficit.      Sensory: No sensory deficit.      Motor: No weakness.      Coordination: Coordination normal.      Gait: Gait normal.      Deep Tendon Reflexes: Reflexes are normal and symmetric.   Psychiatric:         Mood and Affect: Mood normal.         Behavior: Behavior normal.         Thought Content: Thought content normal.         Judgment: Judgment normal.       Physical Exam  Eyes appear normal. Ears are clear. Throat appears normal.  Lungs were auscultated.  Discussed preventative medicine issues with patient including regular exercise, healthy diet, stress reduction, adequate sleep and recommended age-appropriate screening studies.  Assessment & Plan   Assessment & Plan  1. Annual physical examination.  Her last colonoscopy was conducted in 2018, with the subsequent one due in 2028. Laboratory results from the previous year were within normal limits. A comprehensive panel of laboratory tests will be ordered, to be conducted while fasting. She has been advised to maintain hydration prior to the tests. The results of these tests will be communicated via [employer], and any abnormalities will be discussed over the phone.    2. Leg cramps.  She takes methocarbamol as needed for leg cramps, typically on workdays, and finds it effective. A prescription for Robaxin (methocarbamol) has been provided, to be taken as needed for leg cramps. The prescription will be sent to [facility] in [place].    PROCEDURE  Colonoscopy performed in 2018 yielded normal results.    Diagnoses and all orders  for this visit:    1. General medical exam (Primary)  -     CBC (No Diff); Future  -     Basic metabolic panel; Future  -     Lipid Panel; Future    2. Restless leg syndrome    Other orders  -     methocarbamol (Robaxin) 500 MG tablet; Take 1 tablet by mouth Every Evening. As needed for leg cramps  Dispense: 30 tablet; Refill: 11         Patient or patient representative verbalized consent for the use of Ambient Listening during the visit with  Shelli Estrada PA-C for chart documentation. 2/27/2025  17:19 EST

## 2025-03-17 ENCOUNTER — LAB (OUTPATIENT)
Facility: HOSPITAL | Age: 60
End: 2025-03-17
Payer: COMMERCIAL

## 2025-03-17 DIAGNOSIS — Z00.00 GENERAL MEDICAL EXAM: ICD-10-CM

## 2025-03-17 LAB
ANION GAP SERPL CALCULATED.3IONS-SCNC: 10.7 MMOL/L (ref 5–15)
BUN SERPL-MCNC: 11 MG/DL (ref 6–20)
BUN/CREAT SERPL: 12 (ref 7–25)
CALCIUM SPEC-SCNC: 9.6 MG/DL (ref 8.6–10.5)
CHLORIDE SERPL-SCNC: 106 MMOL/L (ref 98–107)
CHOLEST SERPL-MCNC: 185 MG/DL (ref 0–200)
CO2 SERPL-SCNC: 26.3 MMOL/L (ref 22–29)
CREAT SERPL-MCNC: 0.92 MG/DL (ref 0.57–1)
DEPRECATED RDW RBC AUTO: 37.4 FL (ref 37–54)
EGFRCR SERPLBLD CKD-EPI 2021: 72.8 ML/MIN/1.73
ERYTHROCYTE [DISTWIDTH] IN BLOOD BY AUTOMATED COUNT: 11.3 % (ref 12.3–15.4)
GLUCOSE SERPL-MCNC: 88 MG/DL (ref 65–99)
HCT VFR BLD AUTO: 42.8 % (ref 34–46.6)
HDLC SERPL-MCNC: 64 MG/DL (ref 40–60)
HGB BLD-MCNC: 14.3 G/DL (ref 12–15.9)
LDLC SERPL CALC-MCNC: 103 MG/DL (ref 0–100)
LDLC/HDLC SERPL: 1.57 {RATIO}
MCH RBC QN AUTO: 30 PG (ref 26.6–33)
MCHC RBC AUTO-ENTMCNC: 33.4 G/DL (ref 31.5–35.7)
MCV RBC AUTO: 89.7 FL (ref 79–97)
PLATELET # BLD AUTO: 280 10*3/MM3 (ref 140–450)
PMV BLD AUTO: 9.9 FL (ref 6–12)
POTASSIUM SERPL-SCNC: 4.4 MMOL/L (ref 3.5–5.2)
RBC # BLD AUTO: 4.77 10*6/MM3 (ref 3.77–5.28)
SODIUM SERPL-SCNC: 143 MMOL/L (ref 136–145)
TRIGL SERPL-MCNC: 103 MG/DL (ref 0–150)
VLDLC SERPL-MCNC: 18 MG/DL (ref 5–40)
WBC NRBC COR # BLD AUTO: 6.93 10*3/MM3 (ref 3.4–10.8)

## 2025-03-17 PROCEDURE — 80048 BASIC METABOLIC PNL TOTAL CA: CPT

## 2025-03-17 PROCEDURE — 85027 COMPLETE CBC AUTOMATED: CPT

## 2025-03-17 PROCEDURE — 36415 COLL VENOUS BLD VENIPUNCTURE: CPT

## 2025-03-17 PROCEDURE — 80061 LIPID PANEL: CPT

## 2025-03-18 ENCOUNTER — RESULTS FOLLOW-UP (OUTPATIENT)
Dept: FAMILY MEDICINE CLINIC | Facility: CLINIC | Age: 60
End: 2025-03-18
Payer: COMMERCIAL

## 2025-05-22 ENCOUNTER — TELEPHONE (OUTPATIENT)
Dept: OBSTETRICS AND GYNECOLOGY | Facility: CLINIC | Age: 60
End: 2025-05-22
Payer: COMMERCIAL

## 2025-05-22 NOTE — TELEPHONE ENCOUNTER
Her next OV is 6/3/2025. Her current Prior Auth approval for Duavee is good through 6/19/2025 yet a PA was received on [facility].    I called to do the PA over the phone, number [phone]     I was able to get an approval for Duavee from 5/22/2025 to 5/22/26 Ref #[id number]

## 2025-06-05 DIAGNOSIS — Z79.890 HORMONE REPLACEMENT THERAPY: ICD-10-CM

## 2025-06-05 RX ORDER — CITALOPRAM HYDROBROMIDE 20 MG/1
30 TABLET ORAL DAILY
Qty: 135 TABLET | Refills: 0 | Status: SHIPPED | OUTPATIENT
Start: 2025-06-05

## 2025-07-05 DIAGNOSIS — Z79.890 HORMONE REPLACEMENT THERAPY: ICD-10-CM

## 2025-07-07 RX ORDER — CONJUGATED ESTROGENS/BAZEDOXIFENE .45; 2 MG/1; MG/1
1 TABLET, FILM COATED ORAL DAILY
Qty: 30 TABLET | Refills: 0 | Status: SHIPPED | OUTPATIENT
Start: 2025-07-07

## 2025-07-07 RX ORDER — SPIRONOLACTONE 100 MG/1
100 TABLET, FILM COATED ORAL DAILY
Qty: 30 TABLET | Refills: 0 | Status: SHIPPED | OUTPATIENT
Start: 2025-07-07

## 2025-08-05 ENCOUNTER — OFFICE VISIT (OUTPATIENT)
Dept: OBSTETRICS AND GYNECOLOGY | Facility: CLINIC | Age: 60
End: 2025-08-05
Payer: COMMERCIAL

## 2025-08-05 VITALS
BODY MASS INDEX: 23.85 KG/M2 | WEIGHT: 148.4 LBS | DIASTOLIC BLOOD PRESSURE: 80 MMHG | SYSTOLIC BLOOD PRESSURE: 110 MMHG | HEIGHT: 66 IN

## 2025-08-05 DIAGNOSIS — Z12.31 SCREENING MAMMOGRAM FOR BREAST CANCER: ICD-10-CM

## 2025-08-05 DIAGNOSIS — Z01.419 WOMEN'S ANNUAL ROUTINE GYNECOLOGICAL EXAMINATION: Primary | ICD-10-CM

## 2025-08-05 DIAGNOSIS — Z79.890 HORMONE REPLACEMENT THERAPY: ICD-10-CM

## 2025-08-05 RX ORDER — CITALOPRAM HYDROBROMIDE 20 MG/1
30 TABLET ORAL DAILY
Qty: 135 TABLET | Refills: 4 | Status: SHIPPED | OUTPATIENT
Start: 2025-08-05

## 2025-08-05 RX ORDER — CONJUGATED ESTROGENS/BAZEDOXIFENE .45; 2 MG/1; MG/1
1 TABLET, FILM COATED ORAL DAILY
Qty: 84 TABLET | Refills: 4 | Status: SHIPPED | OUTPATIENT
Start: 2025-08-05

## 2025-08-05 RX ORDER — SPIRONOLACTONE 100 MG/1
100 TABLET, FILM COATED ORAL DAILY
Qty: 90 TABLET | Refills: 4 | Status: SHIPPED | OUTPATIENT
Start: 2025-08-05

## 2025-08-06 LAB — REF LAB TEST METHOD: NORMAL

## 2025-08-18 LAB
NCCN CRITERIA FLAG: NORMAL
TYRER CUZICK SCORE: 13.8

## 2025-08-19 ENCOUNTER — HOSPITAL ENCOUNTER (OUTPATIENT)
Dept: MAMMOGRAPHY | Facility: HOSPITAL | Age: 60
Discharge: HOME | End: 2025-08-19
Admitting: ADVANCED PRACTICE MIDWIFE
Payer: COMMERCIAL

## 2025-08-19 DIAGNOSIS — Z12.31 SCREENING MAMMOGRAM FOR BREAST CANCER: ICD-10-CM

## 2025-08-19 PROCEDURE — 77063 BREAST TOMOSYNTHESIS BI: CPT

## 2025-08-19 PROCEDURE — 77067 SCR MAMMO BI INCL CAD: CPT
